# Patient Record
Sex: MALE | Race: OTHER | Employment: FULL TIME | URBAN - METROPOLITAN AREA
[De-identification: names, ages, dates, MRNs, and addresses within clinical notes are randomized per-mention and may not be internally consistent; named-entity substitution may affect disease eponyms.]

---

## 2019-09-20 ENCOUNTER — OFFICE VISIT (OUTPATIENT)
Dept: FAMILY MEDICINE CLINIC | Age: 29
End: 2019-09-20
Payer: COMMERCIAL

## 2019-09-20 VITALS
HEIGHT: 67 IN | WEIGHT: 137 LBS | SYSTOLIC BLOOD PRESSURE: 102 MMHG | HEART RATE: 51 BPM | BODY MASS INDEX: 21.5 KG/M2 | OXYGEN SATURATION: 98 % | DIASTOLIC BLOOD PRESSURE: 67 MMHG | TEMPERATURE: 97.8 F

## 2019-09-20 DIAGNOSIS — K40.90 LEFT INGUINAL HERNIA: ICD-10-CM

## 2019-09-20 DIAGNOSIS — F17.210 CIGARETTE NICOTINE DEPENDENCE WITHOUT COMPLICATION: ICD-10-CM

## 2019-09-20 DIAGNOSIS — Z76.89 ENCOUNTER TO ESTABLISH CARE: Primary | ICD-10-CM

## 2019-09-20 PROCEDURE — 99203 OFFICE O/P NEW LOW 30 MIN: CPT | Performed by: FAMILY MEDICINE

## 2019-09-20 PROCEDURE — 4004F PT TOBACCO SCREEN RCVD TLK: CPT | Performed by: FAMILY MEDICINE

## 2019-09-20 PROCEDURE — G8420 CALC BMI NORM PARAMETERS: HCPCS | Performed by: FAMILY MEDICINE

## 2019-09-20 PROCEDURE — G8427 DOCREV CUR MEDS BY ELIG CLIN: HCPCS | Performed by: FAMILY MEDICINE

## 2019-09-20 RX ORDER — NICOTINE 21 MG/24HR
1 PATCH, TRANSDERMAL 24 HOURS TRANSDERMAL EVERY 24 HOURS
Qty: 30 PATCH | Refills: 1 | Status: SHIPPED | OUTPATIENT
Start: 2019-09-20 | End: 2019-10-08

## 2019-09-20 SDOH — HEALTH STABILITY: MENTAL HEALTH: HOW OFTEN DO YOU HAVE A DRINK CONTAINING ALCOHOL?: NEVER

## 2019-09-20 ASSESSMENT — ENCOUNTER SYMPTOMS
VOMITING: 0
BACK PAIN: 0
DIARRHEA: 0
BLOOD IN STOOL: 0
COUGH: 0
SHORTNESS OF BREATH: 0
NAUSEA: 1
ABDOMINAL PAIN: 0
WHEEZING: 0
SORE THROAT: 0
CONSTIPATION: 0
TROUBLE SWALLOWING: 0

## 2019-09-20 ASSESSMENT — PATIENT HEALTH QUESTIONNAIRE - PHQ9
2. FEELING DOWN, DEPRESSED OR HOPELESS: 0
1. LITTLE INTEREST OR PLEASURE IN DOING THINGS: 0
SUM OF ALL RESPONSES TO PHQ QUESTIONS 1-9: 0
SUM OF ALL RESPONSES TO PHQ9 QUESTIONS 1 & 2: 0
SUM OF ALL RESPONSES TO PHQ QUESTIONS 1-9: 0

## 2019-09-20 NOTE — PROGRESS NOTES
Musculoskeletal: Negative for arthralgias, back pain and neck pain. Neurological: Negative for weakness, numbness and headaches. Psychiatric/Behavioral: Negative for dysphoric mood and sleep disturbance. The patient is not nervous/anxious. Health Maintenance Due   Topic Date Due    Pneumococcal 0-64 years Vaccine (1 of 1 - PPSV23) 12/12/1997    Varicella Vaccine (1 of 2 - 13+ 2-dose series) 12/12/2004    HIV screen  12/12/2006    DTaP/Tdap/Td vaccine (1 - Tdap) 12/12/2010    Flu vaccine (1) 09/01/2019       Current Outpatient Medications   Medication Sig Dispense Refill    Buprenorphine HCl-Naloxone HCl (SUBOXONE SL) Place 16 mg under the tongue      nicotine (NICODERM CQ) 21 MG/24HR Place 1 patch onto the skin every 24 hours (call office for next dose after six weeks of successful treatment) 30 patch 1     No current facility-administered medications for this visit. History    Past Medical History:   Diagnosis Date    Opioid use disorder ([facility] [address]        History reviewed. No pertinent surgical history.     No Known Allergies    Family History   Problem Relation Age of Onset    Hypertension Mother     Pancreatic Cancer Father 64       Social History     Socioeconomic History    Marital status: Single     Spouse name: None    Number of children: None    Years of education: None    Highest education level: None   Occupational History     Employer: [employer]   Social Needs    Financial resource strain: None    Food insecurity:     Worry: None     Inability: None    Transportation needs:     Medical: None     Non-medical: None   Tobacco Use    Smoking status: Current Every Day Smoker     Packs/day: 0.50     Years: 10.00     Pack years: 5.00     Types: Cigarettes     Start date: 9/20/2002    Smokeless tobacco: Never Used    Tobacco comment: Try to quit    Substance and Sexual Activity    Alcohol use: Not Currently     Frequency: Never    Drug use: Not Currently     Comment: opioid addiction     Sexual activity: Not Currently     Partners: Female   Lifestyle    Physical activity:     Days per week: None     Minutes per session: None    Stress: None   Relationships    Social connections:     Talks on phone: None     Gets together: None     Attends Restorationist service: None     Active member of club or organization: None     Attends meetings of clubs or organizations: None     Relationship status: None    Intimate partner violence:     Fear of current or ex partner: None     Emotionally abused: None     Physically abused: None     Forced sexual activity: None   Other Topics Concern    None   Social History Narrative    None       OBJECTIVE    /67   Pulse 51   Temp 97.8 °F (36.6 °C) (Temporal)   Ht 5' 7\" (1.702 m)   Wt 137 lb (62.1 kg)   SpO2 98%   BMI 21.46 kg/m²     Wt Readings from Last 3 Encounters:   09/20/19 137 lb (62.1 kg)       Physical Exam   Constitutional: He is oriented to person, place, and time. No distress. HENT:   Head: Normocephalic and atraumatic. Right Ear: External ear normal.   Left Ear: External ear normal.   Nose: Nose normal.   Mouth/Throat: Oropharynx is clear and moist.   Bifid uvula   Eyes: Pupils are equal, round, and reactive to light. Conjunctivae and EOM are normal.   Neck: Neck supple. Carotid bruit is not present. No thyromegaly present. Cardiovascular: Normal rate, regular rhythm, normal heart sounds and intact distal pulses. Pulmonary/Chest: Effort normal and breath sounds normal.   Abdominal: Soft. Bowel sounds are normal. There is no tenderness. There is no rigidity and no guarding. A hernia is present. Hernia confirmed positive in the left inguinal area. Musculoskeletal: He exhibits no edema. Neurological: He is alert and oriented to person, place, and time. Skin: Skin is warm and dry. He is not diaphoretic. Psychiatric: He has a normal mood and affect. His behavior is normal.       ASSESSMENT AND PLAN    1.  Encounter to establish care    2. Left inguinal hernia  Refer to general surgery for further evaluation and possible surgical correction. Discussed s/s for which to call vs seek emergent medical attention. Provided with relevant printed material.  - [address] [address] [facility]    3. Cigarette nicotine dependence without complication  Begin nicoderm, discussed instructions for proper use. - nicotine (NICODERM CQ) 21 MG/24HR; Place 1 patch onto the skin every 24 hours (call office for next dose after six weeks of successful treatment)  Dispense: 30 patch; Refill: 1    Return in about 1 year (around 9/20/2020) for yearly exam, or sooner as needed. Judge Whipple, DO  09/22/19  8:46 AM    Patient Instructions     Patient Education        Inguinal Hernia: Care Instructions  Your Care Instructions    An inguinal hernia occurs when tissue bulges through a weak spot in your groin area. You may see or feel a tender bulge in the groin or scrotum. You may also have pain, pressure or burning, or a feeling that something has \"given way. \"  Hernias are caused by a weakness in the belly wall. The bulge or discomfort may occur after heavy lifting, straining, or coughing. Hernias do not heal on their own, and they tend to get worse over time. If your hernia does not bother you, you most likely can wait to have surgery. Your hernia may get worse, but it may not. In some cases, hernias that are small and painless may never need to be repaired. Follow-up care is a key part of your treatment and safety. Be sure to make and go to all appointments, and call your doctor if you are having problems. It's also a good idea to know your test results and keep a list of the medicines you take. How can you care for yourself at home? · Take pain medicines exactly as directed. ? If the doctor gave you a prescription medicine for pain, take it as prescribed.   ? If you are not taking a prescription pain medicine, ask your doctor if you can take

## 2019-10-03 ENCOUNTER — INITIAL CONSULT (OUTPATIENT)
Dept: SURGERY | Age: 29
End: 2019-10-03

## 2019-10-03 ENCOUNTER — TELEPHONE (OUTPATIENT)
Dept: SURGERY | Age: 29
End: 2019-10-03

## 2019-10-03 ENCOUNTER — INITIAL CONSULT (OUTPATIENT)
Dept: SURGERY | Age: 29
End: 2019-10-03
Payer: COMMERCIAL

## 2019-10-03 VITALS
HEART RATE: 48 BPM | WEIGHT: 137 LBS | OXYGEN SATURATION: 98 % | SYSTOLIC BLOOD PRESSURE: 110 MMHG | HEIGHT: 68 IN | DIASTOLIC BLOOD PRESSURE: 68 MMHG | BODY MASS INDEX: 20.76 KG/M2 | TEMPERATURE: 97.9 F

## 2019-10-03 DIAGNOSIS — K40.90 UNILATERAL INGUINAL HERNIA WITHOUT OBSTRUCTION OR GANGRENE, RECURRENCE NOT SPECIFIED: Primary | ICD-10-CM

## 2019-10-03 PROCEDURE — 4004F PT TOBACCO SCREEN RCVD TLK: CPT | Performed by: SURGERY

## 2019-10-03 PROCEDURE — G8420 CALC BMI NORM PARAMETERS: HCPCS | Performed by: SURGERY

## 2019-10-03 PROCEDURE — G8427 DOCREV CUR MEDS BY ELIG CLIN: HCPCS | Performed by: SURGERY

## 2019-10-03 PROCEDURE — 99204 OFFICE O/P NEW MOD 45 MIN: CPT | Performed by: SURGERY

## 2019-10-03 PROCEDURE — G8484 FLU IMMUNIZE NO ADMIN: HCPCS | Performed by: SURGERY

## 2019-10-08 ENCOUNTER — ANESTHESIA EVENT (OUTPATIENT)
Dept: OPERATING ROOM | Age: 29
End: 2019-10-08
Payer: COMMERCIAL

## 2019-10-08 ENCOUNTER — PREP FOR PROCEDURE (OUTPATIENT)
Dept: SURGERY | Age: 29
End: 2019-10-08

## 2019-10-08 RX ORDER — SODIUM CHLORIDE 0.9 % (FLUSH) 0.9 %
10 SYRINGE (ML) INJECTION EVERY 12 HOURS SCHEDULED
Status: CANCELLED | OUTPATIENT
Start: 2019-10-08

## 2019-10-08 RX ORDER — SODIUM CHLORIDE 0.9 % (FLUSH) 0.9 %
10 SYRINGE (ML) INJECTION PRN
Status: CANCELLED | OUTPATIENT
Start: 2019-10-08

## 2019-10-08 RX ORDER — SODIUM CHLORIDE, SODIUM LACTATE, POTASSIUM CHLORIDE, CALCIUM CHLORIDE 600; 310; 30; 20 MG/100ML; MG/100ML; MG/100ML; MG/100ML
INJECTION, SOLUTION INTRAVENOUS CONTINUOUS
Status: CANCELLED | OUTPATIENT
Start: 2019-10-08

## 2019-10-09 ENCOUNTER — ANESTHESIA (OUTPATIENT)
Dept: OPERATING ROOM | Age: 29
End: 2019-10-09
Payer: COMMERCIAL

## 2019-10-09 ENCOUNTER — HOSPITAL ENCOUNTER (OUTPATIENT)
Age: 29
Setting detail: OUTPATIENT SURGERY
Discharge: HOME | End: 2019-10-09
Attending: SURGERY | Admitting: SURGERY
Payer: COMMERCIAL

## 2019-10-09 VITALS
SYSTOLIC BLOOD PRESSURE: 116 MMHG | TEMPERATURE: 98.8 F | DIASTOLIC BLOOD PRESSURE: 73 MMHG | HEART RATE: 59 BPM | BODY MASS INDEX: 20.76 KG/M2 | HEIGHT: 68 IN | OXYGEN SATURATION: 100 % | RESPIRATION RATE: 16 BRPM | WEIGHT: 137 LBS

## 2019-10-09 VITALS
RESPIRATION RATE: 1 BRPM | OXYGEN SATURATION: 91 % | TEMPERATURE: 98.6 F | DIASTOLIC BLOOD PRESSURE: 76 MMHG | SYSTOLIC BLOOD PRESSURE: 139 MMHG

## 2019-10-09 PROCEDURE — 2500000003 HC RX 250 WO HCPCS: Performed by: SURGERY

## 2019-10-09 PROCEDURE — 7100000001 HC PACU RECOVERY - ADDTL 15 MIN: Performed by: SURGERY

## 2019-10-09 PROCEDURE — 2580000003 HC RX 258: Performed by: SURGERY

## 2019-10-09 PROCEDURE — 6360000002 HC RX W HCPCS: Performed by: SURGERY

## 2019-10-09 PROCEDURE — 2500000003 HC RX 250 WO HCPCS: Performed by: NURSE ANESTHETIST, CERTIFIED REGISTERED

## 2019-10-09 PROCEDURE — 6360000002 HC RX W HCPCS: Performed by: ANESTHESIOLOGY

## 2019-10-09 PROCEDURE — 3600000014 HC SURGERY LEVEL 4 ADDTL 15MIN: Performed by: SURGERY

## 2019-10-09 PROCEDURE — 2709999900 HC NON-CHARGEABLE SUPPLY: Performed by: SURGERY

## 2019-10-09 PROCEDURE — 3700000001 HC ADD 15 MINUTES (ANESTHESIA): Performed by: SURGERY

## 2019-10-09 PROCEDURE — 49650 LAP ING HERNIA REPAIR INIT: CPT | Performed by: SURGERY

## 2019-10-09 PROCEDURE — C1781 MESH (IMPLANTABLE): HCPCS | Performed by: SURGERY

## 2019-10-09 PROCEDURE — 3700000000 HC ANESTHESIA ATTENDED CARE: Performed by: SURGERY

## 2019-10-09 PROCEDURE — 7100000010 HC PHASE II RECOVERY - FIRST 15 MIN: Performed by: SURGERY

## 2019-10-09 PROCEDURE — 3600000004 HC SURGERY LEVEL 4 BASE: Performed by: SURGERY

## 2019-10-09 PROCEDURE — 2580000003 HC RX 258: Performed by: ANESTHESIOLOGY

## 2019-10-09 PROCEDURE — 88304 TISSUE EXAM BY PATHOLOGIST: CPT

## 2019-10-09 PROCEDURE — 6360000002 HC RX W HCPCS: Performed by: NURSE ANESTHETIST, CERTIFIED REGISTERED

## 2019-10-09 PROCEDURE — 7100000000 HC PACU RECOVERY - FIRST 15 MIN: Performed by: SURGERY

## 2019-10-09 PROCEDURE — 7100000011 HC PHASE II RECOVERY - ADDTL 15 MIN: Performed by: SURGERY

## 2019-10-09 PROCEDURE — 2720000010 HC SURG SUPPLY STERILE: Performed by: SURGERY

## 2019-10-09 DEVICE — MESH SURG XL W4.8XL6.7IN L L PORE LTWT FOR INGUINAL HERN: Type: IMPLANTABLE DEVICE | Site: INGUINAL | Status: FUNCTIONAL

## 2019-10-09 RX ORDER — KETOROLAC TROMETHAMINE 30 MG/ML
INJECTION, SOLUTION INTRAMUSCULAR; INTRAVENOUS
Status: DISCONTINUED
Start: 2019-10-09 | End: 2019-10-09 | Stop reason: HOSPADM

## 2019-10-09 RX ORDER — KETOROLAC TROMETHAMINE 10 MG/1
10 TABLET, FILM COATED ORAL EVERY 6 HOURS PRN
Qty: 20 TABLET | Refills: 0 | Status: SHIPPED | OUTPATIENT
Start: 2019-10-09 | End: 2020-10-08

## 2019-10-09 RX ORDER — SODIUM CHLORIDE, SODIUM LACTATE, POTASSIUM CHLORIDE, CALCIUM CHLORIDE 600; 310; 30; 20 MG/100ML; MG/100ML; MG/100ML; MG/100ML
INJECTION, SOLUTION INTRAVENOUS CONTINUOUS
Status: DISCONTINUED | OUTPATIENT
Start: 2019-10-09 | End: 2019-10-09 | Stop reason: HOSPADM

## 2019-10-09 RX ORDER — KETOROLAC TROMETHAMINE 30 MG/ML
30 INJECTION, SOLUTION INTRAMUSCULAR; INTRAVENOUS
Status: COMPLETED | OUTPATIENT
Start: 2019-10-09 | End: 2019-10-09

## 2019-10-09 RX ORDER — KETOROLAC TROMETHAMINE 30 MG/ML
INJECTION, SOLUTION INTRAMUSCULAR; INTRAVENOUS PRN
Status: DISCONTINUED | OUTPATIENT
Start: 2019-10-09 | End: 2019-10-09 | Stop reason: SDUPTHER

## 2019-10-09 RX ORDER — ROCURONIUM BROMIDE 10 MG/ML
INJECTION, SOLUTION INTRAVENOUS PRN
Status: DISCONTINUED | OUTPATIENT
Start: 2019-10-09 | End: 2019-10-09 | Stop reason: SDUPTHER

## 2019-10-09 RX ORDER — DEXAMETHASONE SODIUM PHOSPHATE 10 MG/ML
INJECTION, SOLUTION INTRAMUSCULAR; INTRAVENOUS PRN
Status: DISCONTINUED | OUTPATIENT
Start: 2019-10-09 | End: 2019-10-09 | Stop reason: SDUPTHER

## 2019-10-09 RX ORDER — PROPOFOL 10 MG/ML
INJECTION, EMULSION INTRAVENOUS PRN
Status: DISCONTINUED | OUTPATIENT
Start: 2019-10-09 | End: 2019-10-09 | Stop reason: SDUPTHER

## 2019-10-09 RX ORDER — FENTANYL CITRATE 50 UG/ML
INJECTION, SOLUTION INTRAMUSCULAR; INTRAVENOUS PRN
Status: DISCONTINUED | OUTPATIENT
Start: 2019-10-09 | End: 2019-10-09 | Stop reason: SDUPTHER

## 2019-10-09 RX ORDER — BUPIVACAINE HYDROCHLORIDE AND EPINEPHRINE 2.5; 5 MG/ML; UG/ML
INJECTION, SOLUTION EPIDURAL; INFILTRATION; INTRACAUDAL; PERINEURAL PRN
Status: DISCONTINUED | OUTPATIENT
Start: 2019-10-09 | End: 2019-10-09 | Stop reason: ALTCHOICE

## 2019-10-09 RX ORDER — NEOSTIGMINE METHYLSULFATE 1 MG/ML
INJECTION, SOLUTION INTRAVENOUS PRN
Status: DISCONTINUED | OUTPATIENT
Start: 2019-10-09 | End: 2019-10-09 | Stop reason: SDUPTHER

## 2019-10-09 RX ORDER — ONDANSETRON 2 MG/ML
INJECTION INTRAMUSCULAR; INTRAVENOUS PRN
Status: DISCONTINUED | OUTPATIENT
Start: 2019-10-09 | End: 2019-10-09 | Stop reason: SDUPTHER

## 2019-10-09 RX ORDER — SODIUM CHLORIDE 0.9 % (FLUSH) 0.9 %
10 SYRINGE (ML) INJECTION EVERY 12 HOURS SCHEDULED
Status: DISCONTINUED | OUTPATIENT
Start: 2019-10-09 | End: 2019-10-09 | Stop reason: HOSPADM

## 2019-10-09 RX ORDER — SODIUM CHLORIDE 0.9 % (FLUSH) 0.9 %
10 SYRINGE (ML) INJECTION PRN
Status: DISCONTINUED | OUTPATIENT
Start: 2019-10-09 | End: 2019-10-09 | Stop reason: SDUPTHER

## 2019-10-09 RX ORDER — MEPERIDINE HYDROCHLORIDE 25 MG/ML
INJECTION INTRAMUSCULAR; INTRAVENOUS; SUBCUTANEOUS
Status: DISCONTINUED
Start: 2019-10-09 | End: 2019-10-09 | Stop reason: HOSPADM

## 2019-10-09 RX ORDER — SODIUM CHLORIDE 0.9 % (FLUSH) 0.9 %
10 SYRINGE (ML) INJECTION PRN
Status: DISCONTINUED | OUTPATIENT
Start: 2019-10-09 | End: 2019-10-09 | Stop reason: HOSPADM

## 2019-10-09 RX ORDER — MEPERIDINE HYDROCHLORIDE 25 MG/ML
12.5 INJECTION INTRAMUSCULAR; INTRAVENOUS; SUBCUTANEOUS ONCE
Status: COMPLETED | OUTPATIENT
Start: 2019-10-09 | End: 2019-10-09

## 2019-10-09 RX ORDER — SODIUM CHLORIDE 0.9 % (FLUSH) 0.9 %
10 SYRINGE (ML) INJECTION EVERY 12 HOURS SCHEDULED
Status: DISCONTINUED | OUTPATIENT
Start: 2019-10-09 | End: 2019-10-09 | Stop reason: SDUPTHER

## 2019-10-09 RX ORDER — GLYCOPYRROLATE 1 MG/5 ML
SYRINGE (ML) INTRAVENOUS PRN
Status: DISCONTINUED | OUTPATIENT
Start: 2019-10-09 | End: 2019-10-09 | Stop reason: SDUPTHER

## 2019-10-09 RX ADMIN — SODIUM CHLORIDE, POTASSIUM CHLORIDE, SODIUM LACTATE AND CALCIUM CHLORIDE: 600; 310; 30; 20 INJECTION, SOLUTION INTRAVENOUS at 09:11

## 2019-10-09 RX ADMIN — Medication 0.6 MG: at 09:40

## 2019-10-09 RX ADMIN — PROPOFOL 200 MG: 10 INJECTION, EMULSION INTRAVENOUS at 09:14

## 2019-10-09 RX ADMIN — Medication 35 MG: at 09:15

## 2019-10-09 RX ADMIN — Medication 2 G: at 09:11

## 2019-10-09 RX ADMIN — ONDANSETRON HYDROCHLORIDE 4 MG: 2 INJECTION, SOLUTION INTRAMUSCULAR; INTRAVENOUS at 09:29

## 2019-10-09 RX ADMIN — KETOROLAC TROMETHAMINE 60 MG: 30 INJECTION, SOLUTION INTRAMUSCULAR; INTRAVENOUS at 09:40

## 2019-10-09 RX ADMIN — FENTANYL CITRATE 100 MCG: 50 INJECTION, SOLUTION INTRAMUSCULAR; INTRAVENOUS at 09:22

## 2019-10-09 RX ADMIN — Medication 3 MG: at 09:40

## 2019-10-09 RX ADMIN — SODIUM CHLORIDE, POTASSIUM CHLORIDE, SODIUM LACTATE AND CALCIUM CHLORIDE: 600; 310; 30; 20 INJECTION, SOLUTION INTRAVENOUS at 08:38

## 2019-10-09 RX ADMIN — FENTANYL CITRATE 50 MCG: 50 INJECTION, SOLUTION INTRAMUSCULAR; INTRAVENOUS at 09:41

## 2019-10-09 RX ADMIN — KETOROLAC TROMETHAMINE 30 MG: 30 INJECTION, SOLUTION INTRAMUSCULAR at 10:18

## 2019-10-09 RX ADMIN — FENTANYL CITRATE 100 MCG: 50 INJECTION, SOLUTION INTRAMUSCULAR; INTRAVENOUS at 09:11

## 2019-10-09 RX ADMIN — MEPERIDINE HYDROCHLORIDE 12.5 MG: 25 INJECTION INTRAMUSCULAR; INTRAVENOUS; SUBCUTANEOUS at 10:16

## 2019-10-09 RX ADMIN — SODIUM CHLORIDE, POTASSIUM CHLORIDE, SODIUM LACTATE AND CALCIUM CHLORIDE: 600; 310; 30; 20 INJECTION, SOLUTION INTRAVENOUS at 09:44

## 2019-10-09 RX ADMIN — DEXAMETHASONE SODIUM PHOSPHATE 10 MG: 10 INJECTION, SOLUTION INTRAMUSCULAR; INTRAVENOUS at 09:28

## 2019-10-09 ASSESSMENT — PULMONARY FUNCTION TESTS
PIF_VALUE: 19
PIF_VALUE: 0
PIF_VALUE: 18
PIF_VALUE: 13
PIF_VALUE: 22
PIF_VALUE: 1
PIF_VALUE: 0
PIF_VALUE: 17
PIF_VALUE: 0
PIF_VALUE: 18
PIF_VALUE: 17
PIF_VALUE: 14
PIF_VALUE: 19
PIF_VALUE: 0
PIF_VALUE: 14
PIF_VALUE: 19
PIF_VALUE: 2
PIF_VALUE: 1
PIF_VALUE: 19
PIF_VALUE: 14
PIF_VALUE: 24
PIF_VALUE: 1
PIF_VALUE: 12
PIF_VALUE: 1
PIF_VALUE: 1
PIF_VALUE: 18
PIF_VALUE: 12
PIF_VALUE: 0
PIF_VALUE: 0
PIF_VALUE: 24
PIF_VALUE: 14
PIF_VALUE: 19
PIF_VALUE: 12
PIF_VALUE: 16
PIF_VALUE: 14
PIF_VALUE: 19
PIF_VALUE: 1
PIF_VALUE: 7
PIF_VALUE: 16

## 2019-10-09 ASSESSMENT — LIFESTYLE VARIABLES: SMOKING_STATUS: 1

## 2019-10-09 ASSESSMENT — PAIN SCALES - GENERAL
PAINLEVEL_OUTOF10: 5
PAINLEVEL_OUTOF10: 0
PAINLEVEL_OUTOF10: 6

## 2019-10-09 ASSESSMENT — PAIN DESCRIPTION - FREQUENCY: FREQUENCY: CONTINUOUS

## 2019-10-09 ASSESSMENT — PAIN - FUNCTIONAL ASSESSMENT
PAIN_FUNCTIONAL_ASSESSMENT: 0-10
PAIN_FUNCTIONAL_ASSESSMENT: ACTIVITIES ARE NOT PREVENTED

## 2019-10-09 ASSESSMENT — PAIN DESCRIPTION - ONSET: ONSET: ON-GOING

## 2019-10-09 ASSESSMENT — PAIN DESCRIPTION - PROGRESSION: CLINICAL_PROGRESSION: NOT CHANGED

## 2019-10-09 ASSESSMENT — PAIN DESCRIPTION - LOCATION: LOCATION: ABDOMEN

## 2019-10-09 ASSESSMENT — PAIN DESCRIPTION - PAIN TYPE: TYPE: SURGICAL PAIN

## 2019-10-09 ASSESSMENT — PAIN DESCRIPTION - DESCRIPTORS: DESCRIPTORS: ACHING;SORE

## 2019-10-09 ASSESSMENT — PAIN DESCRIPTION - ORIENTATION: ORIENTATION: MID

## 2019-10-31 ENCOUNTER — OFFICE VISIT (OUTPATIENT)
Dept: SURGERY | Age: 29
End: 2019-10-31

## 2019-10-31 VITALS
HEIGHT: 68 IN | DIASTOLIC BLOOD PRESSURE: 70 MMHG | BODY MASS INDEX: 20.76 KG/M2 | HEART RATE: 52 BPM | SYSTOLIC BLOOD PRESSURE: 110 MMHG | OXYGEN SATURATION: 98 % | TEMPERATURE: 97.1 F | WEIGHT: 137 LBS

## 2019-10-31 DIAGNOSIS — K40.90 UNILATERAL INGUINAL HERNIA WITHOUT OBSTRUCTION OR GANGRENE, RECURRENCE NOT SPECIFIED: Primary | ICD-10-CM

## 2019-10-31 PROCEDURE — 99024 POSTOP FOLLOW-UP VISIT: CPT | Performed by: SURGERY

## 2023-03-08 ENCOUNTER — HOSPITAL ENCOUNTER (EMERGENCY)
Age: 33
Discharge: HOME | End: 2023-03-08
Payer: COMMERCIAL

## 2023-03-08 VITALS
HEART RATE: 79 BPM | DIASTOLIC BLOOD PRESSURE: 110 MMHG | TEMPERATURE: 97.9 F | BODY MASS INDEX: 20.73 KG/M2 | SYSTOLIC BLOOD PRESSURE: 131 MMHG | WEIGHT: 140 LBS | OXYGEN SATURATION: 99 % | RESPIRATION RATE: 16 BRPM | HEIGHT: 69 IN

## 2023-03-08 DIAGNOSIS — S61.211A LACERATION OF LEFT INDEX FINGER WITHOUT FOREIGN BODY WITHOUT DAMAGE TO NAIL, INITIAL ENCOUNTER: Primary | ICD-10-CM

## 2023-03-08 PROCEDURE — 99282 EMERGENCY DEPT VISIT SF MDM: CPT

## 2023-03-08 PROCEDURE — 12001 RPR S/N/AX/GEN/TRNK 2.5CM/<: CPT

## 2023-03-08 ASSESSMENT — PAIN - FUNCTIONAL ASSESSMENT: PAIN_FUNCTIONAL_ASSESSMENT: NONE - DENIES PAIN

## 2023-03-08 NOTE — Clinical Note
Naun Bonner was seen and treated in our emergency department on 3/8/2023. He may return to work on 03/09/2023. Light duty if available      If you have any questions or concerns, please don't hesitate to call.       Rodger Lazaro PA-C

## 2023-03-08 NOTE — ED PROVIDER NOTES
Independent [facility] Visit. Department of Emergency Medicine   ED  Provider Note  Admit Date/RoomTime: 3/8/2023 12:40 PM  ED Room: 36/36   Chief Complaint   Laceration (Left second digit)    History of Present Illness      Sourav Goldman is a 33 y.o. old male presenting to the emergency department for a laceration to the left index finger. 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 He states his tetanus shot was within the last 2 years. He has no active bleeding at this exam.  He has full range of motion of affected finger but does have pain while doing so. Patient is alert and oriented x3 and in no apparent distress at this exam.  He is nontoxic-appearing. He denies any other complaint or concern at this ED visit. The patients tetanus status is within past 5 years. PCP: DO JAN Umana   Pertinent positives and negatives are stated within HPI, all other systems reviewed and are negative. Past Medical History:  has a past medical history of Opioid use disorder ([facility] [address] Past Surgical History:  has a past surgical history that includes hernia repair (Left, 10/9/2019). Social History:  reports that he has been smoking cigarettes. He started smoking about 20 years ago. He has a 5.00 pack-year smoking history. He has never used smokeless tobacco. He reports that he does not currently use alcohol. He reports that he does not currently use drugs. Family History: family history includes Hypertension in his mother; Pancreatic Cancer (age of onset: 64) in his father.      Allergies: Shellfish-derived products  Allergies reviewed with patient     Physical Exam   VS:  BP (!) 131/110   Pulse 79   Temp 97.9 °F (36.6 °C)   Resp 16   Ht 5' 9\" (1.753 m)   Wt 140 lb (63.5 kg)   SpO2 99%   BMI 20.67 kg/m²      Oxygen Saturation Interpretation: Normal.    Constitutional:  Alert and oriented x4, development consistent with age.  HEENT:  NC/NT. Airway patent. PERRLA, EOMI   Neck:  Normal ROM. Supple. Non-tender. Extremities: 1cm C-shaped laceration to DIP joint of left index finger, intact sensations distally, full range of motion, no active bleeding at this exam  Lymphatics: No lymphangitis or adenopathy noted. Neurological: CN II-XII grossly intact, Motor functions intact. Lab / Imaging Results   (All laboratory and radiology results have been personally reviewed by myself)  Labs:  No results found for this visit on 03/08/23. Imaging: All Radiology results interpreted by Radiologist unless otherwise noted. No orders to display     Procedure(s):  PROCEDURE NOTE      LACERATION REPAIR  Risks, benefits and alternatives (for applicable procedures below) described. Performed By: Leatha Herndon PA-C. Laceration #: 1. Location: left index  Length: 1cm. The wound area was irrigated with sterile saline, cleansed with povidone iodine, cleansend with shur-clens and draped in a sterile fashion. Local Anesthesia:  Lidocaine 1% without epinephrine. The wound was explored with the following results:  no foreign body or tendon injury seen. Flaps Aligned: yes. The wound was closed with 5-0 Ethilon using interrupted sutures. Dressing:  bacitracin and a sterile dressing was placed. Total number suture:  7. There were no additional lacerations requiring repair. -- MEDICAL DECISION MAKING --   History From: History from : Patient  Limitations to history : None    Record Review:  Outpatient Notes reviewed  Other Records Reviewed : None    CC/HPI Summary, DDx, ED Course, and Reassessment:   Patient presents to the ED for Laceration (Left second digit)    This is a 69-year-old male who presents with simple laceration to left index finger. Wound was repaired with 7 sutures.   No evidence of tendon injury    Patient was given the following medications:  Medications - No data to display     Differential diagnoses included but not limited to tendon injury, laceration     Reassessment:  Patient continues to be non-toxic on re-evaluation. Chronic Conditions:   Past Medical History:   Diagnosis Date    Opioid use disorder [facility])      ED COURSE:      Any labs and imaging were reviewed by myself. Consultations:  None  Discussion with Other Profesionals : None    COUNSELING:   I have spoken with the patient/caregiver and discussed todays results, in addition to providing specific details for the plan of care and counseling regarding the diagnosis and prognosis and are agreeable with the plan. All results reviewed with pt and all questions answered. I discussed the differential, results and discharge plan with the patient and/or family/friend/caregiver if present. I emphasized the importance of follow-up with the physician I referred them to in the timeframe recommended. I explained reasons for the patient to return to the Emergency Department. Additional verbal discharge instructions were also given and discussed with the patient to supplement those generated by the EMR. We also discussed medications that were prescribed (if any) including common side effects and interactions. All questions were addressed. They understand return precautions and discharge instructions. The patient and/or family/friend/caregiver expressed understanding. Vitals were stable and they were in no distress at discharge. Findings were discussed with the patient and reasons to immediately return to the ED were articulated to them. Patient educated on wound care and signs and symptoms of infection that would require his emergent return to the ED.     Patient will follow-up with their PMD    DISPOSITION CONSIDERATIONS:    Disposition Considerations:  (include Tests not done, Shared Decision Making, Pt Expectation of Test or Tx.):   Appropriate for outpatient management        Social Determinants:  Social Determinants : None    MEDICATIONS:   DISCHARGE MEDICATIONS:  Discharge Medication List as of 3/8/2023  1:49 PM        DISCONTINUED MEDICATIONS:  Discharge Medication List as of 3/8/2023  1:49 PM        DIAGNOSIS:     1. Laceration of left index finger without foreign body without damage to nail, initial encounter      This patient's ED course included: a personal history and physicial examination  This patient has remained hemodynamically stable during their ED course. DISPOSITION:   Discharge to home. Patient condition is good. Discharge Instructions:   Patient referred to  [address] [place] [address] -- 8 [facility]  [address] [facility] [phone]  Call in 1 day  for follow-up on ED visit for workers comp visit    Vikki Lizarraga [address] [phone]      Electronically signed by Lisa Peng PA-C   DD: 3/8/23  I am the Primary Clinician of Record. **This report was transcribed using voice recognition software. Every effort was made to ensure accuracy; however, inadvertent computerized transcription errors may be present.     END OF PROVIDER NOTE        Lisa Peng PA-C  03/08/23 7538

## 2023-03-08 NOTE — Clinical Note
Shelton Ayon was seen and treated in our emergency department on 3/8/2023. He may return to work on 03/15/2023. If you have any questions or concerns, please don't hesitate to call.       Princess Padgett PA-C

## 2023-10-17 ENCOUNTER — HOSPITAL ENCOUNTER (INPATIENT)
Age: 33
LOS: 6 days | Discharge: HOME | DRG: 751 | End: 2023-10-23
Attending: EMERGENCY MEDICINE | Admitting: PSYCHIATRY & NEUROLOGY
Payer: MEDICAID

## 2023-10-17 DIAGNOSIS — F19.10 POLYSUBSTANCE ABUSE (HCC): ICD-10-CM

## 2023-10-17 DIAGNOSIS — R44.3 HALLUCINATION: Primary | ICD-10-CM

## 2023-10-17 PROBLEM — F23 ACUTE PSYCHOSIS (HCC): Status: ACTIVE | Noted: 2023-10-17

## 2023-10-17 LAB
ALBUMIN SERPL-MCNC: 4.1 G/DL (ref 3.5–5.2)
ALP SERPL-CCNC: 54 U/L (ref 40–129)
ALT SERPL-CCNC: 45 U/L (ref 0–40)
AMPHET UR QL SCN: NEGATIVE
ANION GAP SERPL CALCULATED.3IONS-SCNC: 12 MMOL/L (ref 7–16)
APAP SERPL-MCNC: <5 UG/ML (ref 10–30)
AST SERPL-CCNC: 36 U/L (ref 0–39)
BACTERIA URNS QL MICRO: ABNORMAL
BARBITURATES UR QL SCN: NEGATIVE
BASOPHILS # BLD: 0.05 K/UL (ref 0–0.2)
BASOPHILS NFR BLD: 1 % (ref 0–2)
BENZODIAZ UR QL: NEGATIVE
BILIRUB SERPL-MCNC: 0.3 MG/DL (ref 0–1.2)
BILIRUB UR QL STRIP: NEGATIVE
BUN SERPL-MCNC: 12 MG/DL (ref 6–20)
BUPRENORPHINE UR QL: POSITIVE
CALCIUM SERPL-MCNC: 9 MG/DL (ref 8.6–10.2)
CANNABINOIDS UR QL SCN: NEGATIVE
CHLORIDE SERPL-SCNC: 102 MMOL/L (ref 98–107)
CLARITY UR: ABNORMAL
CO2 SERPL-SCNC: 25 MMOL/L (ref 22–29)
COCAINE UR QL SCN: NEGATIVE
COLOR UR: YELLOW
CREAT SERPL-MCNC: 0.8 MG/DL (ref 0.7–1.2)
EKG ATRIAL RATE: 55 BPM
EKG P AXIS: 71 DEGREES
EKG P-R INTERVAL: 140 MS
EKG Q-T INTERVAL: 438 MS
EKG QRS DURATION: 90 MS
EKG QTC CALCULATION (BAZETT): 419 MS
EKG R AXIS: 97 DEGREES
EKG T AXIS: 63 DEGREES
EKG VENTRICULAR RATE: 55 BPM
EOSINOPHIL # BLD: 0.16 K/UL (ref 0.05–0.5)
EOSINOPHILS RELATIVE PERCENT: 2 % (ref 0–6)
EPI CELLS #/AREA URNS HPF: ABNORMAL /HPF
ERYTHROCYTE [DISTWIDTH] IN BLOOD BY AUTOMATED COUNT: 12.9 % (ref 11.5–15)
ETHANOLAMINE SERPL-MCNC: <10 MG/DL
FENTANYL UR QL: NEGATIVE
GFR SERPL CREATININE-BSD FRML MDRD: >60 ML/MIN/1.73M2
GLUCOSE BLD-MCNC: 115 MG/DL (ref 74–99)
GLUCOSE SERPL-MCNC: 59 MG/DL (ref 74–99)
GLUCOSE UR STRIP-MCNC: NEGATIVE MG/DL
HCT VFR BLD AUTO: 38.6 % (ref 37–54)
HGB BLD-MCNC: 12.9 G/DL (ref 12.5–16.5)
HGB UR QL STRIP.AUTO: NEGATIVE
IMM GRANULOCYTES # BLD AUTO: <0.03 K/UL (ref 0–0.58)
IMM GRANULOCYTES NFR BLD: 0 % (ref 0–5)
KETONES UR STRIP-MCNC: NEGATIVE MG/DL
LEUKOCYTE ESTERASE UR QL STRIP: ABNORMAL
LYMPHOCYTES NFR BLD: 2.48 K/UL (ref 1.5–4)
LYMPHOCYTES RELATIVE PERCENT: 34 % (ref 20–42)
MCH RBC QN AUTO: 28.9 PG (ref 26–35)
MCHC RBC AUTO-ENTMCNC: 33.4 G/DL (ref 32–34.5)
MCV RBC AUTO: 86.5 FL (ref 80–99.9)
METHADONE UR QL: NEGATIVE
MONOCYTES NFR BLD: 0.5 K/UL (ref 0.1–0.95)
MONOCYTES NFR BLD: 7 % (ref 2–12)
NEUTROPHILS NFR BLD: 56 % (ref 43–80)
NEUTS SEG NFR BLD: 3.99 K/UL (ref 1.8–7.3)
NITRITE UR QL STRIP: NEGATIVE
OPIATES UR QL SCN: NEGATIVE
OXYCODONE UR QL SCN: NEGATIVE
PCP UR QL SCN: NEGATIVE
PH UR STRIP: 7.5 [PH] (ref 5–9)
PLATELET # BLD AUTO: 303 K/UL (ref 130–450)
PMV BLD AUTO: 9.5 FL (ref 7–12)
POTASSIUM SERPL-SCNC: 3.9 MMOL/L (ref 3.5–5)
PROT SERPL-MCNC: 6.6 G/DL (ref 6.4–8.3)
PROT UR STRIP-MCNC: NEGATIVE MG/DL
RBC # BLD AUTO: 4.46 M/UL (ref 3.8–5.8)
RBC #/AREA URNS HPF: ABNORMAL /HPF
SALICYLATES SERPL-MCNC: <0.3 MG/DL (ref 0–30)
SODIUM SERPL-SCNC: 139 MMOL/L (ref 132–146)
SP GR UR STRIP: 1.01 (ref 1–1.03)
TEST INFORMATION: ABNORMAL
TOXIC TRICYCLIC SC,BLOOD: NEGATIVE
UROBILINOGEN UR STRIP-ACNC: 0.2 EU/DL (ref 0–1)
WBC #/AREA URNS HPF: ABNORMAL /HPF
WBC OTHER # BLD: 7.2 K/UL (ref 4.5–11.5)

## 2023-10-17 PROCEDURE — 80179 DRUG ASSAY SALICYLATE: CPT

## 2023-10-17 PROCEDURE — 93005 ELECTROCARDIOGRAM TRACING: CPT | Performed by: NURSE PRACTITIONER

## 2023-10-17 PROCEDURE — 80307 DRUG TEST PRSMV CHEM ANLYZR: CPT

## 2023-10-17 PROCEDURE — 80053 COMPREHEN METABOLIC PANEL: CPT

## 2023-10-17 PROCEDURE — 6370000000 HC RX 637 (ALT 250 FOR IP): Performed by: NURSE PRACTITIONER

## 2023-10-17 PROCEDURE — 93010 ELECTROCARDIOGRAM REPORT: CPT | Performed by: INTERNAL MEDICINE

## 2023-10-17 PROCEDURE — 1240000000 HC EMOTIONAL WELLNESS R&B

## 2023-10-17 PROCEDURE — 6370000000 HC RX 637 (ALT 250 FOR IP): Performed by: EMERGENCY MEDICINE

## 2023-10-17 PROCEDURE — 99285 EMERGENCY DEPT VISIT HI MDM: CPT

## 2023-10-17 PROCEDURE — 80143 DRUG ASSAY ACETAMINOPHEN: CPT

## 2023-10-17 PROCEDURE — 85025 COMPLETE CBC W/AUTO DIFF WBC: CPT

## 2023-10-17 PROCEDURE — 81001 URINALYSIS AUTO W/SCOPE: CPT

## 2023-10-17 PROCEDURE — 82962 GLUCOSE BLOOD TEST: CPT

## 2023-10-17 PROCEDURE — G0480 DRUG TEST DEF 1-7 CLASSES: HCPCS

## 2023-10-17 RX ORDER — ACETAMINOPHEN 500 MG
1000 TABLET ORAL ONCE
Status: COMPLETED | OUTPATIENT
Start: 2023-10-17 | End: 2023-10-17

## 2023-10-17 RX ORDER — ACETAMINOPHEN 325 MG/1
650 TABLET ORAL EVERY 6 HOURS PRN
Status: DISCONTINUED | OUTPATIENT
Start: 2023-10-17 | End: 2023-10-23 | Stop reason: HOSPADM

## 2023-10-17 RX ORDER — HYDROXYZINE PAMOATE 50 MG/1
50 CAPSULE ORAL 3 TIMES DAILY PRN
Status: DISCONTINUED | OUTPATIENT
Start: 2023-10-17 | End: 2023-10-23 | Stop reason: HOSPADM

## 2023-10-17 RX ORDER — HALOPERIDOL 5 MG/ML
5 INJECTION INTRAMUSCULAR EVERY 6 HOURS PRN
Status: DISCONTINUED | OUTPATIENT
Start: 2023-10-17 | End: 2023-10-23 | Stop reason: HOSPADM

## 2023-10-17 RX ORDER — POLYETHYLENE GLYCOL 3350 17 G
2 POWDER IN PACKET (EA) ORAL ONCE
Status: COMPLETED | OUTPATIENT
Start: 2023-10-17 | End: 2023-10-17

## 2023-10-17 RX ORDER — MAGNESIUM HYDROXIDE/ALUMINUM HYDROXICE/SIMETHICONE 120; 1200; 1200 MG/30ML; MG/30ML; MG/30ML
30 SUSPENSION ORAL PRN
Status: DISCONTINUED | OUTPATIENT
Start: 2023-10-17 | End: 2023-10-23 | Stop reason: HOSPADM

## 2023-10-17 RX ORDER — LANOLIN ALCOHOL/MO/W.PET/CERES
3 CREAM (GRAM) TOPICAL NIGHTLY PRN
Status: DISCONTINUED | OUTPATIENT
Start: 2023-10-17 | End: 2023-10-20

## 2023-10-17 RX ORDER — HALOPERIDOL 5 MG/1
5 TABLET ORAL EVERY 6 HOURS PRN
Status: DISCONTINUED | OUTPATIENT
Start: 2023-10-17 | End: 2023-10-23 | Stop reason: HOSPADM

## 2023-10-17 RX ORDER — NICOTINE 21 MG/24HR
1 PATCH, TRANSDERMAL 24 HOURS TRANSDERMAL DAILY
Status: DISCONTINUED | OUTPATIENT
Start: 2023-10-18 | End: 2023-10-23 | Stop reason: HOSPADM

## 2023-10-17 RX ADMIN — NICOTINE POLACRILEX 2 MG: 2 LOZENGE ORAL at 16:47

## 2023-10-17 RX ADMIN — ACETAMINOPHEN 1000 MG: 500 TABLET ORAL at 04:49

## 2023-10-17 ASSESSMENT — PAIN DESCRIPTION - DESCRIPTORS: DESCRIPTORS: TINGLING

## 2023-10-17 ASSESSMENT — PAIN - FUNCTIONAL ASSESSMENT
PAIN_FUNCTIONAL_ASSESSMENT: 0-10
PAIN_FUNCTIONAL_ASSESSMENT: NONE - DENIES PAIN

## 2023-10-17 ASSESSMENT — PATIENT HEALTH QUESTIONNAIRE - PHQ9
SUM OF ALL RESPONSES TO PHQ9 QUESTIONS 1 & 2: 0
2. FEELING DOWN, DEPRESSED OR HOPELESS: 0
SUM OF ALL RESPONSES TO PHQ QUESTIONS 1-9: 0
SUM OF ALL RESPONSES TO PHQ QUESTIONS 1-9: 0
1. LITTLE INTEREST OR PLEASURE IN DOING THINGS: 0
SUM OF ALL RESPONSES TO PHQ QUESTIONS 1-9: 0
SUM OF ALL RESPONSES TO PHQ QUESTIONS 1-9: 0

## 2023-10-17 ASSESSMENT — PAIN SCALES - GENERAL: PAINLEVEL_OUTOF10: 5

## 2023-10-17 ASSESSMENT — PAIN DESCRIPTION - PAIN TYPE: TYPE: CHRONIC PAIN

## 2023-10-17 ASSESSMENT — PAIN DESCRIPTION - ORIENTATION: ORIENTATION: RIGHT;LEFT

## 2023-10-17 ASSESSMENT — PAIN DESCRIPTION - LOCATION: LOCATION: FOOT;ANKLE

## 2023-10-17 ASSESSMENT — LIFESTYLE VARIABLES: HOW OFTEN DO YOU HAVE A DRINK CONTAINING ALCOHOL: NEVER

## 2023-10-17 NOTE — ED NOTES
N-n given to miko morley @ [facility], Vladimir Paige RN  10/17/23 1821 [facility], RN  10/17/23 2772

## 2023-10-17 NOTE — ED NOTES
Unable to complete discussing case with on call d/t wanting bianca information concerning why pt at [facility].   Informed fide [facility] of this entry     Summer Smith  10/17/23 8149
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
## 2023-10-17 NOTE — ED NOTES
The pt meets criteria for inpt admission. Informed Sidney Connolly of need to review.      Radha Yi, [facility]  10/17/23 2602

## 2023-10-17 NOTE — ED NOTES
Pt reports he is not on Suboxone states he hasn't had it in month then in another breath pt reports that he buys it off  the street.   But this time I thought I bought heroin     Mynor Skaggs RN  10/17/23 6093

## 2023-10-17 NOTE — ED NOTES
Patient brought back to [facility] by [facility]. Per Dr. Dena Jensen patient [facility], patient okay for 15 minute monitoring.        Frederick Gould RN  10/17/23 6607

## 2023-10-17 NOTE — ED NOTES
Pt requested nicotine patch then stated would like to try lozenges. Orders placed at this time.      Lillie Barcenas RN  10/17/23 3988

## 2023-10-17 NOTE — ED NOTES
The pt was accepted to [facility] room 2308. Disposition called to Mehdi Must in admitting.      Ernesto Mcbride, [facility]  10/17/23 0336

## 2023-10-17 NOTE — ED NOTES
Pt overheard stating that he should just leave. This rn informed pt of involuntary hold and given copy at this time.      Michel Young RN  10/17/23 7420

## 2023-10-17 NOTE — ED PROVIDER NOTES
[facility]  ED  Encounter Note  Admit Date/RoomTime: 10/17/2023  1:11 AM  ED Room: CHAIR05/C5   Department of Emergency Medicine  ED Provider Note  Admit Date: 10/17/2023  Room: 970 [facility]      ED Physician     10/17/23  1:20 AM EDT    HPI: Abraham Rosa 33 y.o. male presents with a complaint of auditory and visual hallucinations beginning days ago. Complaint has been constant and became more severe today which is what prompted the visit. Denies suicidal ideation. Denies homicidal ideation. No specific plan. Patient presents to the emergency department with his , patient is reporting having hallucinations both auditory and visual.  States he has never had a mental health evaluation. ,  He also reports not being on any medications. Patient reports that he is hearing people from his past.  He also reports at times seeing people in his peripheral vision. Patient denies any suicidal or homicidal ideations. He would also like to speak to someone about going and drug detox, states that he relapsed 3 days ago using methamphetamines as well as opioids/heroin. Patient denies any alcohol use. Patient otherwise denies chest pain denies shortness of breath denies any abdominal pain as well as no noted nausea, vomiting or diarrhea. Patient calm with clear and coherent speech, nonpressured. Review of Systems:   Pertinent positives and negatives are stated within HPI, all other systems reviewed and are negative.      --------------------------------------------- PAST HISTORY ---------------------------------------------  Past Medical History:  has a past medical history of Opioid use disorder ([address] Past Surgical History:  has a past surgical history that includes hernia repair (Left, 10/9/2019). Social History:  reports that he has been smoking cigarettes. He started smoking about 21 years ago.  He has a 5.00 pack-year smoking

## 2023-10-18 PROBLEM — F39 MOOD DISORDER (HCC): Status: ACTIVE | Noted: 2023-10-18

## 2023-10-18 PROBLEM — F19.94 SUBSTANCE INDUCED MOOD DISORDER (HCC): Status: ACTIVE | Noted: 2023-10-18

## 2023-10-18 PROCEDURE — 1240000000 HC EMOTIONAL WELLNESS R&B

## 2023-10-18 PROCEDURE — 6370000000 HC RX 637 (ALT 250 FOR IP): Performed by: NURSE PRACTITIONER

## 2023-10-18 PROCEDURE — 90792 PSYCH DIAG EVAL W/MED SRVCS: CPT | Performed by: NURSE PRACTITIONER

## 2023-10-18 PROCEDURE — 6370000000 HC RX 637 (ALT 250 FOR IP): Performed by: PSYCHIATRY & NEUROLOGY

## 2023-10-18 RX ORDER — DIVALPROEX SODIUM 500 MG/1
500 TABLET, EXTENDED RELEASE ORAL NIGHTLY
Status: DISCONTINUED | OUTPATIENT
Start: 2023-10-18 | End: 2023-10-20

## 2023-10-18 RX ORDER — OLANZAPINE 5 MG/1
5 TABLET ORAL NIGHTLY
Status: DISCONTINUED | OUTPATIENT
Start: 2023-10-18 | End: 2023-10-20

## 2023-10-18 RX ADMIN — OLANZAPINE 5 MG: 5 TABLET, FILM COATED ORAL at 21:44

## 2023-10-18 RX ADMIN — DIVALPROEX SODIUM 500 MG: 500 TABLET, EXTENDED RELEASE ORAL at 21:44

## 2023-10-18 RX ADMIN — MELATONIN 3 MG ORAL TABLET 3 MG: 3 TABLET ORAL at 21:44

## 2023-10-18 ASSESSMENT — SLEEP AND FATIGUE QUESTIONNAIRES
DO YOU USE A SLEEP AID: NO
DO YOU HAVE DIFFICULTY SLEEPING: NO
DO YOU USE A SLEEP AID: NO
DO YOU HAVE DIFFICULTY SLEEPING: YES
AVERAGE NUMBER OF SLEEP HOURS: 5
AVERAGE NUMBER OF SLEEP HOURS: 5
SLEEP PATTERN: DIFFICULTY FALLING ASLEEP;DISTURBED/INTERRUPTED SLEEP

## 2023-10-18 ASSESSMENT — LIFESTYLE VARIABLES
HOW OFTEN DO YOU HAVE A DRINK CONTAINING ALCOHOL: NEVER
HOW MANY STANDARD DRINKS CONTAINING ALCOHOL DO YOU HAVE ON A TYPICAL DAY: PATIENT DOES NOT DRINK
HOW MANY STANDARD DRINKS CONTAINING ALCOHOL DO YOU HAVE ON A TYPICAL DAY: PATIENT DOES NOT DRINK
HOW OFTEN DO YOU HAVE A DRINK CONTAINING ALCOHOL: NEVER
HOW MANY STANDARD DRINKS CONTAINING ALCOHOL DO YOU HAVE ON A TYPICAL DAY: PATIENT DOES NOT DRINK
HOW OFTEN DO YOU HAVE A DRINK CONTAINING ALCOHOL: NEVER

## 2023-10-18 ASSESSMENT — PATIENT HEALTH QUESTIONNAIRE - PHQ9
SUM OF ALL RESPONSES TO PHQ QUESTIONS 1-9: 16
2. FEELING DOWN, DEPRESSED OR HOPELESS: 3
6. FEELING BAD ABOUT YOURSELF - OR THAT YOU ARE A FAILURE OR HAVE LET YOURSELF OR YOUR FAMILY DOWN: 2
10. IF YOU CHECKED OFF ANY PROBLEMS, HOW DIFFICULT HAVE THESE PROBLEMS MADE IT FOR YOU TO DO YOUR WORK, TAKE CARE OF THINGS AT HOME, OR GET ALONG WITH OTHER PEOPLE: 2
2. FEELING DOWN, DEPRESSED OR HOPELESS: 1
1. LITTLE INTEREST OR PLEASURE IN DOING THINGS: 3
7. TROUBLE CONCENTRATING ON THINGS, SUCH AS READING THE NEWSPAPER OR WATCHING TELEVISION: 2
8. MOVING OR SPEAKING SO SLOWLY THAT OTHER PEOPLE COULD HAVE NOTICED. OR THE OPPOSITE, BEING SO FIGETY OR RESTLESS THAT YOU HAVE BEEN MOVING AROUND A LOT MORE THAN USUAL: 1
SUM OF ALL RESPONSES TO PHQ QUESTIONS 1-9: 1
SUM OF ALL RESPONSES TO PHQ9 QUESTIONS 1 & 2: 1
SUM OF ALL RESPONSES TO PHQ QUESTIONS 1-9: 16
SUM OF ALL RESPONSES TO PHQ QUESTIONS 1-9: 1
SUM OF ALL RESPONSES TO PHQ QUESTIONS 1-9: 1
1. LITTLE INTEREST OR PLEASURE IN DOING THINGS: 0
5. POOR APPETITE OR OVEREATING: 0
4. FEELING TIRED OR HAVING LITTLE ENERGY: 2
SUM OF ALL RESPONSES TO PHQ QUESTIONS 1-9: 1
SUM OF ALL RESPONSES TO PHQ QUESTIONS 1-9: 15
3. TROUBLE FALLING OR STAYING ASLEEP: 2
SUM OF ALL RESPONSES TO PHQ QUESTIONS 1-9: 16
SUM OF ALL RESPONSES TO PHQ9 QUESTIONS 1 & 2: 6
9. THOUGHTS THAT YOU WOULD BE BETTER OFF DEAD, OR OF HURTING YOURSELF: 1

## 2023-10-18 NOTE — CARE COORDINATION
Biopsychosocial Assessment Note    Social work met with patient to complete the biopsychosocial assessment and C-SSRS. Chief Complaint: \"I need evaluated for probation\". Mental Status Exam: Pt presents as A&Ox4, friendly and cooperative with good eye contact. Pt is anxious and depressed with congruent affect. PT thoughts appear organized. PT insight/judgment poor. PT denied SI/HI/VH, admits to [facility] of sounds- nothing specific and nothing commanding. Clinical Summary: Pt reports that he is here because he needed a mental health evaluation for his . He reports that he also came on his own because he has mental health needs and wants to be put on mental health medications. Pt reports that he has been struggling with depression and anxiety. He reports that he has also been having auditory and visual hallucinations. PT reports that he was \"hearing different things throughout the day\", but denied that it was anything specific. He also states that he has been \"seeing things that he has seen in the past\", but again denied anything specific. PT denied that they are commanding hallucinations. Pt denied SI/HI prior to admission. Pt reports that this is his first psychiatric admission. Per chart, pt has hx of admission to [facility] for possible SI. Pt reports that he has a hx of outpatient treatment in [place] for his depression, anxiety, and PTSD. Pt denied hx of suicide attempts, reports that he has a hx of SI, most recently being about a week ago when he was in [facility]. Pt admits that he has been feeling hopeless/helpless and that he has had little interest/pleasure in doing things recently. PT reports that life in general is a stressor for him. PT denied hx of trauma/abuse. He reports that he was recently charged with misdemeanor for trespassing and menacing, that he was released from [facility] a week ago and is on probation in [facility].  PT reports that he uses \"every\"

## 2023-10-18 NOTE — DISCHARGE INSTRUCTIONS
Attending Provider: Kem Mansfield MD.     If you have any questions and need to contact this individual please call the unit at [phone]. [facility] Provider will be available on call 24/7 and during holidays. Reason for Admission: Patient states that he is here per recommendation from his . Patient admits to having both auditory and visual hallucinations that started around 2 months ago. Patient states, \"I hear things that kind of give me signs to do this or do that. Sometimes its names, states, or things I heard throughout the day. I usually hear one word at a time and then I fill in the rest in my head. I kind of like it though\". States that he also \"sees things in my peripheral\". Patient admits to recent use of amphetamines and marijuana before coming to the hospital       Follow up for Tobacco Cessation at:    [facility] Tobacco Treatment                                 Date:  Friday 10/27 at [address] [place] [address]. [address]   ([address] [place] [address] elevators to 7th floor)   Phone: [phone]   Fax: [phone]         Homeless Resources:       [address] [facility] [phone]     [address] before 6535 [facility] PM [address] [phone]        after 4 PM  [address] [facility], [address] [phone]     [address] [place] [address],[address] Floor- [address] [phone]     [facility] [address] [facility] [address] [place] [address] [phone] 49[address] [facility], [address]     [address] [phone] [facility] [phone]     [place] [phone]     The [address] [place] [address]- Phone:

## 2023-10-18 NOTE — H&P
of mental health admissions  Substance abuse  History of legal problems and criminal activity  Isolated from family  Limited support system  Protective factors  Access to essential needs and shelter  Help seeking behavior    Collateral Information:  Will obtain collateral information from the family or friends. Will obtain medical records as appropriate from out patient providers  Will consult the hospitalist for a physical exam to rule out any co-morbid physical condition. Home medication Reconciled   Reviewed continued as clinically indicated  Patient not currently prescribed buprenorphine    New Medications started during this admission :    Depakote 250 mg twice daily for mood stabilization  Zyprexa 5 mg at at bedtime for augmentation of treatment    Prn Haldol 5mg and Vistaril 50mg q6hr for extreme agitation. Trazodone as ordered for insomnia  Vistaril as ordered for anxiety      Psychotherapy:   Encourage participation in milieu and group therapy  Individual therapy as needed      NOTE: This report was transcribed using voice recognition software. Every effort was made to ensure accuracy; however, inadvertent computerized transcription errors may be present. Behavioral Services  Medicare Certification Upon Admission    I certify that this patient's inpatient psychiatric hospital admission is medically necessary for:    [x] (1) Treatment which could reasonably be expected to improve this patient's condition,       [x] (2) Or for diagnostic study;     AND     [x](2) The inpatient psychiatric services are provided while the individual is under the care of a physician and are included in the individualized plan of care.     Estimated length of stay/service 5 to 7 days based on stability    Plan for post-hospital care follow-up with outpatient provider    Electronically signed by ANNALEE Noriega CNP on 10/18/2023 at 9:17 AM      Electronically signed by ANNALEE Noriega CNP on 10/18/2023 at

## 2023-10-19 LAB
CHOLEST SERPL-MCNC: 179 MG/DL
HBA1C MFR BLD: 5.5 % (ref 4–5.6)
HDLC SERPL-MCNC: 114 MG/DL
LDLC SERPL CALC-MCNC: 52 MG/DL
TRIGL SERPL-MCNC: 66 MG/DL
VLDLC SERPL CALC-MCNC: 13 MG/DL

## 2023-10-19 PROCEDURE — 6370000000 HC RX 637 (ALT 250 FOR IP): Performed by: NURSE PRACTITIONER

## 2023-10-19 PROCEDURE — 6370000000 HC RX 637 (ALT 250 FOR IP): Performed by: PSYCHIATRY & NEUROLOGY

## 2023-10-19 PROCEDURE — 36415 COLL VENOUS BLD VENIPUNCTURE: CPT

## 2023-10-19 PROCEDURE — 83036 HEMOGLOBIN GLYCOSYLATED A1C: CPT

## 2023-10-19 PROCEDURE — 99232 SBSQ HOSP IP/OBS MODERATE 35: CPT | Performed by: NURSE PRACTITIONER

## 2023-10-19 PROCEDURE — 1240000000 HC EMOTIONAL WELLNESS R&B

## 2023-10-19 PROCEDURE — 80061 LIPID PANEL: CPT

## 2023-10-19 RX ADMIN — OLANZAPINE 5 MG: 5 TABLET, FILM COATED ORAL at 21:29

## 2023-10-19 RX ADMIN — MELATONIN 3 MG ORAL TABLET 3 MG: 3 TABLET ORAL at 21:30

## 2023-10-19 RX ADMIN — DIVALPROEX SODIUM 500 MG: 500 TABLET, EXTENDED RELEASE ORAL at 21:29

## 2023-10-19 RX ADMIN — ACETAMINOPHEN 650 MG: 325 TABLET ORAL at 20:14

## 2023-10-19 ASSESSMENT — PAIN DESCRIPTION - DESCRIPTORS: DESCRIPTORS: ACHING;THROBBING

## 2023-10-19 ASSESSMENT — PAIN DESCRIPTION - LOCATION: LOCATION: BACK;HEAD

## 2023-10-19 NOTE — GROUP NOTE
Group Therapy Note    Date: 10/19/2023    Group Start Time: 1105  Group End Time: 7204  Group Topic: Psychotherapy    [facility]    Minerva Mendez MSW, ROSENDO        Group Therapy Note    Attendees: 9       Patient's Goal:  To increase social interaction and improve relationships with others. Notes:  Pt was attentive in group and was able to identify an agenda. They were also able to verbalize relating to others within the group. Status After Intervention:  Improved    Participation Level: Active Listener and Interactive    Participation Quality: Appropriate, Attentive, and Sharing      Speech:  normal      Thought Process/Content: Logical      Affective Functioning: Congruent      Mood: anxious      Level of consciousness:  Alert, Oriented x4, and Attentive      Response to Learning: Able to verbalize current knowledge/experience, Able to verbalize/acknowledge new learning, and Able to retain information      Endings: None Reported    Modes of Intervention: Education, Support, Socialization, Exploration, Clarifying, and Problem-solving      Discipline Responsible: /Counselor      Signature:   SPARKLE Hernández, ROSENDO

## 2023-10-19 NOTE — CARE COORDINATION
JUDAH contacted [facility] [phone] and was advised pt has not been assigned an officer yet. He will need to come complete an intake and at that time he will be assigned a . JUDAH contacted [employer] [phone] and notified pt  Hector Baez of pt admission. Hector Baez stated pt will need to go to the office on day of discharge.

## 2023-10-20 PROCEDURE — 1240000000 HC EMOTIONAL WELLNESS R&B

## 2023-10-20 PROCEDURE — 6370000000 HC RX 637 (ALT 250 FOR IP): Performed by: PSYCHIATRY & NEUROLOGY

## 2023-10-20 PROCEDURE — 99232 SBSQ HOSP IP/OBS MODERATE 35: CPT | Performed by: NURSE PRACTITIONER

## 2023-10-20 PROCEDURE — 6370000000 HC RX 637 (ALT 250 FOR IP): Performed by: NURSE PRACTITIONER

## 2023-10-20 RX ORDER — OXCARBAZEPINE 150 MG/1
150 TABLET, FILM COATED ORAL 2 TIMES DAILY
Status: DISCONTINUED | OUTPATIENT
Start: 2023-10-20 | End: 2023-10-21

## 2023-10-20 RX ADMIN — HYDROXYZINE PAMOATE 50 MG: 50 CAPSULE ORAL at 10:23

## 2023-10-20 RX ADMIN — OXCARBAZEPINE 150 MG: 150 TABLET, FILM COATED ORAL at 21:04

## 2023-10-20 RX ADMIN — OLANZAPINE 7.5 MG: 5 TABLET, FILM COATED ORAL at 21:04

## 2023-10-20 RX ADMIN — OXCARBAZEPINE 150 MG: 150 TABLET, FILM COATED ORAL at 10:22

## 2023-10-20 NOTE — GROUP NOTE
Group Therapy Note    Date: 10/20/2023    Group Start Time: 1115  Group End Time: 1150  Group Topic: Cognitive Skills    [facility]    Erich Jimenez MSW, LSW        Group Therapy Note    Attendees:11       Patient's Goal: to participate in group discussion on positive psychology prompt cards. Notes: pt was an active participant in group. Status After Intervention:  Unchanged    Participation Level:  Active Listener and Interactive    Participation Quality: Attentive and Sharing      Speech:  normal      Thought Process/Content: Logical      Affective Functioning: Congruent      Mood: anxious      Level of consciousness:  Alert and Oriented x4      Response to Learning: Able to verbalize current knowledge/experience      Endings: None Reported    Modes of Intervention: Education, Support, Socialization, Exploration, Clarifying, and Problem-solving      Discipline Responsible: /Counselor      Signature:  SPARKLE Blake, LSPETER

## 2023-10-20 NOTE — CARE COORDINATION
Pt was seen during treatment team. Pt reports feeling tired from the medications. Pt denied SI/HI/VH. Pt reports AH of \"regular voices. \" Pt has been experiencing AH for 3 months and he is not on the right medications because \"they are not working fast enough. \" Pt stated he came in for depression and his depression is \"killing me slowly. \" Pt does not want to go to a substance abuse rehab at time of discharge. Pt plans to \"hit the streets. \" Pt was staying at a house prior to admission but he will not be returning there. Pt has stayed with a boss in the past but the boss did not pay him all summer. Pt reports his  will tell him to go to the rescue mission but he does not want to go there because he cannot smoke marijuana. Pt reports he walked here to get help with his depression because it has been \"horrible. \" Pt reports a loss of interest in things he used to enjoy. Pt cooperative, flat, blunt, poor insight/judgement.

## 2023-10-21 PROCEDURE — 99232 SBSQ HOSP IP/OBS MODERATE 35: CPT | Performed by: NURSE PRACTITIONER

## 2023-10-21 PROCEDURE — 1240000000 HC EMOTIONAL WELLNESS R&B

## 2023-10-21 PROCEDURE — 6370000000 HC RX 637 (ALT 250 FOR IP): Performed by: PSYCHIATRY & NEUROLOGY

## 2023-10-21 PROCEDURE — 6370000000 HC RX 637 (ALT 250 FOR IP): Performed by: NURSE PRACTITIONER

## 2023-10-21 RX ORDER — OXCARBAZEPINE 300 MG/1
300 TABLET, FILM COATED ORAL 2 TIMES DAILY
Status: DISCONTINUED | OUTPATIENT
Start: 2023-10-21 | End: 2023-10-23 | Stop reason: HOSPADM

## 2023-10-21 RX ADMIN — OXCARBAZEPINE 300 MG: 300 TABLET, FILM COATED ORAL at 20:57

## 2023-10-21 RX ADMIN — OXCARBAZEPINE 150 MG: 150 TABLET, FILM COATED ORAL at 08:53

## 2023-10-21 RX ADMIN — HYDROXYZINE PAMOATE 50 MG: 50 CAPSULE ORAL at 20:57

## 2023-10-21 RX ADMIN — OLANZAPINE 7.5 MG: 5 TABLET, FILM COATED ORAL at 20:57

## 2023-10-21 RX ADMIN — HYDROXYZINE PAMOATE 50 MG: 50 CAPSULE ORAL at 13:21

## 2023-10-21 ASSESSMENT — PAIN DESCRIPTION - PAIN TYPE: TYPE: CHRONIC PAIN

## 2023-10-21 ASSESSMENT — PAIN DESCRIPTION - LOCATION: LOCATION: BACK;LEG

## 2023-10-21 ASSESSMENT — PAIN DESCRIPTION - ORIENTATION: ORIENTATION: RIGHT;LEFT

## 2023-10-21 ASSESSMENT — PAIN SCALES - GENERAL: PAINLEVEL_OUTOF10: 8

## 2023-10-21 NOTE — GROUP NOTE
Group Therapy Note    Date: 10/21/2023    Group Start Time: 1100  Group End Time: 4703  Group Topic: Cognitive Skills    [facility]  [place] Edwina, Reid ríos, SPARKLE, LSW        Group Therapy Note    Attendees: 10       Patient's Goal:  Pt will be able to identify triggers for anger, coping skills to help with anger, how to manage and prevent anger and warning signs. Notes:  pt participated in group and made connections. Status After Intervention:  Improved    Participation Level:  Active Listener and Interactive    Participation Quality: Appropriate, Attentive, Sharing, and Supportive      Speech:  normal      Thought Process/Content: Logical  Linear      Affective Functioning: Congruent      Mood: anxious      Level of consciousness:  Alert, Oriented x4, and Attentive      Response to Learning: Able to verbalize current knowledge/experience, Able to verbalize/acknowledge new learning, Able to retain information, and Capable of insight      Endings: None Reported    Modes of Intervention: Education, Support, Socialization, Exploration, Clarifying, and Problem-solving      Discipline Responsible: /Counselor      Signature:  SPARKLE Norton, [facility]

## 2023-10-21 NOTE — GROUP NOTE
Group Therapy Note    Date: 10/21/2023  Start Time: 1430  End Time:  1263  Number of Participants: 11    Type of Group: Recreational    Wellness Binder Information  Module Name:  Guess It Game      Patient's Goal:  to increase socialization amongst peers. Enhance cognitive skills through trivia. Notes:  CTRS facilitated a game called the [employer] it Game    Status After Intervention:  Improved    Participation Level:  Active Listener and Interactive    Participation Quality: Appropriate, Attentive, Sharing, and Supportive      Speech:  normal      Thought Process/Content: Logical      Affective Functioning: Congruent      Mood: euthymic      Level of consciousness:  Alert and Attentive      Response to Learning: Able to verbalize current knowledge/experience, Able to verbalize/acknowledge new learning, and Able to retain information      Endings: None Reported    Modes of Intervention: Education, Socialization, and Activity      Discipline Responsible: Psychoeducational Specialist      Signature:  AMY Shelley     Group Therapy Note    Attendees: 11

## 2023-10-22 PROCEDURE — 99232 SBSQ HOSP IP/OBS MODERATE 35: CPT | Performed by: NURSE PRACTITIONER

## 2023-10-22 PROCEDURE — 6370000000 HC RX 637 (ALT 250 FOR IP): Performed by: NURSE PRACTITIONER

## 2023-10-22 PROCEDURE — 6370000000 HC RX 637 (ALT 250 FOR IP): Performed by: PSYCHIATRY & NEUROLOGY

## 2023-10-22 PROCEDURE — 1240000000 HC EMOTIONAL WELLNESS R&B

## 2023-10-22 RX ORDER — OLANZAPINE 10 MG/1
10 TABLET ORAL NIGHTLY
Status: DISCONTINUED | OUTPATIENT
Start: 2023-10-22 | End: 2023-10-23 | Stop reason: HOSPADM

## 2023-10-22 RX ADMIN — OXCARBAZEPINE 300 MG: 300 TABLET, FILM COATED ORAL at 09:06

## 2023-10-22 RX ADMIN — HYDROXYZINE PAMOATE 50 MG: 50 CAPSULE ORAL at 12:20

## 2023-10-22 RX ADMIN — HYDROXYZINE PAMOATE 50 MG: 50 CAPSULE ORAL at 21:47

## 2023-10-22 RX ADMIN — OLANZAPINE 10 MG: 10 TABLET, FILM COATED ORAL at 21:11

## 2023-10-22 RX ADMIN — OXCARBAZEPINE 300 MG: 300 TABLET, FILM COATED ORAL at 21:11

## 2023-10-22 ASSESSMENT — PAIN SCALES - GENERAL
PAINLEVEL_OUTOF10: 0
PAINLEVEL_OUTOF10: 0

## 2023-10-22 NOTE — GROUP NOTE
Group Therapy Note    Date: 10/22/2023    Group Start Time: 1100  Group End Time: 7484  Group Topic: Cognitive Skills    [facility]  [facility] Reid Bland, SPARKLE, ROSENDO        Group Therapy Note    Attendees: 13     Patient's Goal:  Pt will be able to identify their values and how they change in different environments and how they have changed with different life experiences. Notes:  pt participated in group and made connections. Status After Intervention:  Improved    Participation Level:  Active Listener and Interactive    Participation Quality: Appropriate, Attentive, Sharing, and Supportive      Speech:  normal      Thought Process/Content: Logical  Linear      Affective Functioning: Congruent      Mood: anxious      Level of consciousness:  Alert, Oriented x4, and Attentive      Response to Learning: Able to verbalize current knowledge/experience, Able to verbalize/acknowledge new learning, Able to retain information, and Capable of insight      Endings: None Reported    Modes of Intervention: Education, Support, Socialization, Exploration, Clarifying, and Problem-solving      Discipline Responsible: /Counselor      Signature:  SPARKLE Poe, [place]

## 2023-10-22 NOTE — GROUP NOTE
Group Therapy Note    Date: 10/22/2023    Group Start Time: 1010  Group End Time: 1050  Group Topic: Psychoeducation    [facility]    Emmanuel Russell                                                                        Group Therapy Note    Date: 10/22/2023  Module Name:  creating a healthy routine   Patient's Goal:  Patient will be able to id holguin steps to maintaining a healthy routine. Notes:  Pleasant and engaged in group. Able to share when prompted and accepting of handout. Status After Intervention:  Improved    Participation Level:  Active Listener and Interactive    Participation Quality: Appropriate, Attentive, and Sharing      Speech:  normal      Thought Process/Content: Logical      Affective Functioning: Congruent      Mood: euthymic      Level of consciousness:  Alert, Oriented x4, and Attentive      Response to Learning: Able to verbalize/acknowledge new learning, Able to retain information, and Progressing to goal      Endings: None Reported    Modes of Intervention: Education, Support, Socialization, and Problem-solving      Discipline Responsible: Psychoeducational Specialist      Signature:  Emmanuel Russell

## 2023-10-22 NOTE — GROUP NOTE
Group Therapy Note    Date: 10/22/2023    Group Start Time: 5353  Group End Time: 5013  Group Topic: Recreational    [facility]    Erickson Ayala                                                                        Group Therapy Note    Date: 10/22/2023  Module Name:  Thought catalog trivia   Patient's Goal:  Patient will be able to participate in group trivia about fast food, sitcom television. Notes:  Pleasant and engaged in group, able to share appropriately with others. Status After Intervention:  Improved    Participation Level:  Active Listener and Interactive    Participation Quality: Appropriate, Attentive, and Sharing      Speech:  normal      Thought Process/Content: Logical      Affective Functioning: Congruent      Mood: euthymic      Level of consciousness:  Alert, Oriented x4, and Attentive      Response to Learning: Able to verbalize/acknowledge new learning, Able to retain information, and Progressing to goal      Endings: None Reported    Modes of Intervention: Support, Socialization, and Activity      Discipline Responsible: Psychoeducational Specialist      Signature:  Erickson Ayala

## 2023-10-22 NOTE — PLAN OF CARE
[facility]  Day 3 Interdisciplinary Treatment Plan NOTE    Review Date & Time: 10/20/2023   1:17 PM     Patient was in treatment team    Estimated Length of Stay Update:  7 days  Estimated Discharge Date Update: 10/24/23    EDUCATION:   Learner Progress Toward Treatment Goals: Reviewed results and recommendations of this team    Method: Group    Outcome: Verbalized understanding    PATIENT GOALS:  \" to feel less depressed\"    PLAN/TREATMENT RECOMMENDATIONS UPDATE: encourage daily goals and groups    GOALS UPDATE:   Time frame for Short-Term Goals: by discharge      Raudel Cruz RN
Patient is alert and oriented x 4. Denies pain. No noted signs or symptoms of distress. Denies SI/HI, or thoughts of self harm when asked. Patient states he hears \"things I've heard in the past\" when asked for further detail patient states [employer], places. \"  Patient states these are his A/H when he was asked. Rates Anxiety at 8/10 and Depression at 8/10. Attended on unit groups this shift. Medication compliant. Polite, and cooperative. Constricted Affect, underlying irritability, describes feelings of hopelessness and helplessness. Appropriate with peers and staff. Appears well groomed/neat, room Is clean. Goal for the day was - \"get out of my room and walk. \"    Up for all meals. Will continue to monitor for safety q 15 minute safety rounds and environmental assessments. Problem: Anxiety  Goal: Will report anxiety at manageable levels  Description: INTERVENTIONS:  1. Administer medication as ordered  2. Teach and rehearse alternative coping skills  3.  Provide emotional support with 1:1 interaction with staff  10/22/2023 1700 by Aleyda Gracia RN  Outcome: Not Progressing  Flowsheets (Taken 10/22/2023 0830)  Will report anxiety at manageable levels: Provide emotional support with 1:1 interaction with staff  10/22/2023 0607 by Orquidea Wang RN  Outcome: Progressing
Problem: Anxiety  Goal: Will report anxiety at manageable levels  Description: INTERVENTIONS:  1. Administer medication as ordered  2. Teach and rehearse alternative coping skills  3. Provide emotional support with 1:1 interaction with staff  10/18/2023 0946 by Latia Garcia RN  Outcome: Progressing  10/18/2023 0440 by Yessenia Isaac RN  Outcome: Progressing     Problem: Coping  Goal: Pt/Family able to verbalize concerns and demonstrate effective coping strategies  Description: INTERVENTIONS:  1. Assist patient/family to identify coping skills, available support systems and cultural and spiritual values  2. Provide emotional support, including active listening and acknowledgement of concerns of patient and caregivers  3. Reduce environmental stimuli, as able  4. Instruct patient/family in relaxation techniques, as appropriate  5. Assess for spiritual pain/suffering and initiate Spiritual Care, Psychosocial Clinical Specialist consults as needed  Outcome: Progressing     Problem: Decision Making  Goal: Pt/Family able to effectively weigh alternatives and participate in decision making related to treatment and care  Description: INTERVENTIONS:  1. Determine when there are differences between patient's view, family's view, and healthcare provider's view of condition  2. Facilitate patient and family articulation of goals for care  3. Help patient and family identify pros/cons of alternative solutions  4. Provide information as requested by patient/family  5. Respect patient/family right to receive or not to receive information  6. Serve as a liaison between patient and family and health care team  7. Initiate Consults from Ethics, Palliative Care or initiate [address] as is appropriate  10/18/2023 0440 by Yessenia Isaac RN  Outcome: Progressing     Problem: Confusion  Goal: Confusion, delirium, dementia, or psychosis is improved or at baseline  Description: INTERVENTIONS:  1.  Assess for
Problem: Anxiety  Goal: Will report anxiety at manageable levels  Description: INTERVENTIONS:  1. Administer medication as ordered  2. Teach and rehearse alternative coping skills  3. Provide emotional support with 1:1 interaction with staff  Outcome: Not Progressing     Problem: Depression/Self Harm  Goal: Effect of psychiatric condition will be minimized and patient will be protected from self harm  Description: INTERVENTIONS:  1. Assess impact of patient's symptoms on level of functioning, self care needs and offer support as indicated  2. Assess patient/family knowledge of depression, impact on illness and need for teaching  3. Provide emotional support, presence and reassurance  4. Assess for possible suicidal thoughts or ideation. If patient expresses suicidal thoughts or statements do not leave alone, initiate Suicide Precautions, move to a room close to the nursing station and obtain sitter  5. Initiate consults as appropriate with Mental Health Professional, Spiritual Care, Psychosocial CNS, and consider a recommendation to the LIP for a Psychiatric Consultation  Outcome: Not Progressing         PATIENT PLEASANT AND COOPERATIVE. FLAT AND DEPRESSED. ISOLATIVE TO ROOM. MINIMAL CONVERSATION. DENIES SUICIDAL AND HOMICIDAL THOUGHTS. DENIES HALLUCINATIONS.
Problem: Anxiety  Goal: Will report anxiety at manageable levels  Description: INTERVENTIONS:  1. Administer medication as ordered  2. Teach and rehearse alternative coping skills  3. Provide emotional support with 1:1 interaction with staff  Outcome: Progressing     Problem: Decision Making  Goal: Pt/Family able to effectively weigh alternatives and participate in decision making related to treatment and care  Description: INTERVENTIONS:  1. Determine when there are differences between patient's view, family's view, and healthcare provider's view of condition  2. Facilitate patient and family articulation of goals for care  3. Help patient and family identify pros/cons of alternative solutions  4. Provide information as requested by patient/family  5. Respect patient/family right to receive or not to receive information  6. Serve as a liaison between patient and family and health care team  7. Initiate Consults from Ethics, Palliative Care or initiate [facility] as is appropriate  Outcome: Progressing     Problem: Confusion  Goal: Confusion, delirium, dementia, or psychosis is improved or at baseline  Description: INTERVENTIONS:  1. Assess for possible contributors to thought disturbance, including medications, impaired vision or hearing, underlying metabolic abnormalities, dehydration, psychiatric diagnoses, and notify attending LIP  2. [place] high risk fall precautions, as indicated  3. Provide frequent short contacts to provide reality reorientation, refocusing and direction  4. Decrease environmental stimuli, including noise as appropriate  5. Monitor and intervene to maintain adequate nutrition, hydration, elimination, sleep and activity  6. If unable to ensure safety without constant attention obtain sitter and review sitter guidelines with assigned personnel  7.  Initiate Psychosocial CNS and Spiritual Care consult, as indicated  Outcome: Progressing
Problem: Coping  Goal: Pt/Family able to verbalize concerns and demonstrate effective coping strategies  Description: INTERVENTIONS:  1. Assist patient/family to identify coping skills, available support systems and cultural and spiritual values  2. Provide emotional support, including active listening and acknowledgement of concerns of patient and caregivers  3. Reduce environmental stimuli, as able  4. Instruct patient/family in relaxation techniques, as appropriate  5. Assess for spiritual pain/suffering and initiate Spiritual Care, Psychosocial Clinical Specialist consults as needed  Outcome: Not Progressing     Problem: Depression/Self Harm  Goal: Effect of psychiatric condition will be minimized and patient will be protected from self harm  Description: INTERVENTIONS:  1. Assess impact of patient's symptoms on level of functioning, self care needs and offer support as indicated  2. Assess patient/family knowledge of depression, impact on illness and need for teaching  3. Provide emotional support, presence and reassurance  4. Assess for possible suicidal thoughts or ideation. If patient expresses suicidal thoughts or statements do not leave alone, initiate Suicide Precautions, move to a room close to the nursing station and obtain sitter  5. Initiate consults as appropriate with Mental Health Professional, Spiritual Care, Psychosocial CNS, and consider a recommendation to the LIP for a Psychiatric Consultation  Outcome: Not Progressing        Patient has been isolative to self and room except e does attend groups. Instructed on ordered for 2 hour lockouts. He verbalizes he doesn't feel any better and his depression is the same. Irritable in treatment team. He reports \"don't care anymore, not on the right meds\". He wants to \"hit the streets\" when discharged. Later patient was brighter and pleasant. Denies suicidal and homicidal thoughts. Admits to hallucinations but vague and would not elaborate.
Pt resting in bed apparently asleep with easy even respirations at HS q 15 min electronic rounding.
Pt was out into milieu at intervals. Denied suicidal/homicidal ideations. Denied hallucinations, but said he saw \"shadows passing by\" in his side vision. Appears sad and rates depression 10/10. Pt appears tense and rates his anxiety 8/10. Pt said he has chronic pain in his back and legs and rates the pain 8/10, said that is why he lays down. Pt remains on close observation staggered q 15 min checks.
exit the unit/facility without proper excort  Outcome: Progressing     Problem: Pain  Goal: Verbalizes/displays adequate comfort level or baseline comfort level  Outcome: Progressing

## 2023-10-23 VITALS
BODY MASS INDEX: 19.99 KG/M2 | TEMPERATURE: 98.7 F | HEIGHT: 69 IN | RESPIRATION RATE: 14 BRPM | DIASTOLIC BLOOD PRESSURE: 65 MMHG | SYSTOLIC BLOOD PRESSURE: 108 MMHG | HEART RATE: 52 BPM | OXYGEN SATURATION: 99 % | WEIGHT: 135 LBS

## 2023-10-23 PROCEDURE — 6360000002 HC RX W HCPCS: Performed by: NURSE PRACTITIONER

## 2023-10-23 PROCEDURE — 99239 HOSP IP/OBS DSCHRG MGMT >30: CPT | Performed by: NURSE PRACTITIONER

## 2023-10-23 PROCEDURE — 6370000000 HC RX 637 (ALT 250 FOR IP): Performed by: PSYCHIATRY & NEUROLOGY

## 2023-10-23 PROCEDURE — 90686 IIV4 VACC NO PRSV 0.5 ML IM: CPT | Performed by: NURSE PRACTITIONER

## 2023-10-23 PROCEDURE — 6370000000 HC RX 637 (ALT 250 FOR IP): Performed by: NURSE PRACTITIONER

## 2023-10-23 PROCEDURE — G0008 ADMIN INFLUENZA VIRUS VAC: HCPCS | Performed by: NURSE PRACTITIONER

## 2023-10-23 RX ORDER — OLANZAPINE 10 MG/1
10 TABLET ORAL NIGHTLY
Qty: 30 TABLET | Refills: 0 | Status: ON HOLD | OUTPATIENT
Start: 2023-10-23 | End: 2023-11-22

## 2023-10-23 RX ORDER — OXCARBAZEPINE 300 MG/1
300 TABLET, FILM COATED ORAL 2 TIMES DAILY
Qty: 60 TABLET | Refills: 0 | Status: ON HOLD | OUTPATIENT
Start: 2023-10-23 | End: 2023-11-22

## 2023-10-23 RX ADMIN — OXCARBAZEPINE 300 MG: 300 TABLET, FILM COATED ORAL at 08:36

## 2023-10-23 RX ADMIN — INFLUENZA VIRUS VACCINE 0.5 ML: 15; 15; 15; 15 SUSPENSION INTRAMUSCULAR at 12:28

## 2023-10-23 RX ADMIN — ACETAMINOPHEN 650 MG: 325 TABLET ORAL at 14:00

## 2023-10-23 ASSESSMENT — PAIN DESCRIPTION - LOCATION: LOCATION: HEAD

## 2023-10-23 ASSESSMENT — PAIN SCALES - GENERAL
PAINLEVEL_OUTOF10: 3
PAINLEVEL_OUTOF10: 0

## 2023-10-23 NOTE — DISCHARGE SUMMARY
DISCHARGE SUMMARY      Patient ID:  Amaris Wilcox  [id number]  71 y.o.  1990    Admit date: 10/17/2023    Discharge date and time: 10/23/2023    Admitting Physician: Mirta Cordero MD     Discharge Physician: Dr Mando Danielle MD    Discharge Diagnoses:   Patient Active Problem List   Diagnosis    Left inguinal hernia    Acute psychosis ([address])    Mood disorder ([address])    Substance induced mood disorder ([address])       Admission Condition: poor    Discharged Condition: stable    Admission Circumstance: Presented to the ER with complaint of visual hallucinations.  Pink slipped       PAST MEDICAL/PSYCHIATRIC HISTORY:   Past Medical History:   Diagnosis Date    Opioid use disorder        FAMILY/SOCIAL HISTORY:  Family History   Problem Relation Age of Onset    Hypertension Mother     Pancreatic Cancer Father 64     Social History     Socioeconomic History    Marital status: Single     Spouse name: Not on file    Number of children: Not on file    Years of education: Not on file    Highest education level: Not on file   Occupational History     Employer: [employer]   Tobacco Use    Smoking status: Every Day     Packs/day: 0.50     Years: 10.00     Additional pack years: 0.00     Total pack years: 5.00     Types: Cigarettes     Start date: 9/20/2002    Smokeless tobacco: Never    Tobacco comments:     Try to quit    Vaping Use    Vaping Use: Never used   Substance and Sexual Activity    Alcohol use: Not Currently    Drug use: Not Currently     Comment: opioid addiction clean about 2 years    Sexual activity: Not Currently     Partners: Female   Other Topics Concern    Not on file   Social History Narrative    Not on file     Social Determinants of Health     Financial Resource Strain: Not on file   Food Insecurity: Not on file   Transportation Needs: Not on file   Physical Activity: Not on file   Stress: Not on file   Social Connections: Not on file   Intimate Partner Violence: Not on file   Housing Stability: Not on file

## 2023-10-23 NOTE — CARE COORDINATION
Pt was seen during treatment team. Pt reports feeling good today, denied SI/HI/VH and stated the AH are better than when he came in. Pt feels ready to discharge today. Pt plans to go meet with his  in [facility], then go to the [facility] probation office to complete the intake to get to connected with a , and then try to stay with his mom who lives in [facility]. Pt tried to call his mom yesterday but she did not answer. Pt will be connected with a provider near [facility]. SW will also place homeless resources in his discharge paperwork. Pt cooperative, pleasant, appropriate, with good eye contact, clear speech, improved insight/judgement. JUDAH contacted pt mom Maritza Chaney [phone] (verbal permission given during treatment team). No answer, a voicemail was left. JUDAH contacted [facility] [phone] and scheduled an intake appointment on 11/2.      In order to ensure appropriate transition and discharge planning is in place, the following documents have been transmitted to [facility], as the new outpatient provider:    The d/c diagnosis was transmitted to the next care provider  The reason for hospitalization was transmitted to the next care provider  The d/c medications (dosage and indication) were transmitted to the next care provider   The continuing care plan was transmitted to the next care provider

## 2023-10-23 NOTE — GROUP NOTE
Group Therapy Note    Date: 10/23/2023    Group Start Time: 1010  Group End Time: 1050  Group Topic: Psychoeducation    [facility] 7[facility] ACUTE BH 2    Mckenna Yeh                                                                        Group Therapy Note    Date: 10/23/2023  Start Time: 1010  End Time:  1050  Number of Participants: 15    Type of Group: Psychoeducation    Wellness Binder Information  Module Name:  Sleep Hygiene      Patient's Goal:  To ID 1 or 2 ways to improve sleep. Notes:  CTRS discussed sleep facts and the importance of sleep. CTRS then discussed ways to improve sleep. Patient was able to ID one way to improve sleep patterns, and was receptive to information provided. Patient was also accepting of handout. Status After Intervention:  Improved    Participation Level:  Active Listener and Interactive    Participation Quality: Appropriate, Attentive, and Sharing      Speech:  normal      Thought Process/Content: Logical      Affective Functioning: Congruent      Mood: euthymic      Level of consciousness:  Alert and Attentive      Response to Learning: Able to verbalize current knowledge/experience, Able to verbalize/acknowledge new learning, and Able to retain information      Endings: None Reported    Modes of Intervention: Education and Support      Discipline Responsible: Psychoeducational Specialist      Signature:  Mckenna Yeh

## 2023-10-23 NOTE — CASE COMMUNICATION
Per RN, patient has a copay of approximately $30.00 and he does not have any money to pay for it. SW Supervisor called the outpatient pharmacy and spoke to Peterson Sheets, who reported that pt's prescriptions were submitted to pt's medicaid and he owes $30.00. JUDAH Supervisor approved medication voucher and provided it to [place].     SPARKLE Yates, CRISTIANO Romero Work Supervisor

## 2023-10-23 NOTE — GROUP NOTE
Group Therapy Note    Date: 10/23/2023    Group Start Time: 1115  Group End Time: 1200  Group Topic: Psychotherapy    [facility] 7[facility] ACUTE BH 1    Minerva Mendez, SPARKLE, ANDERSONW        Group Therapy Note    Attendees: 10       Patient's Goal:  To increase social interaction and improve relationships with others. Notes:  Pt was attentive in group and was able to identify an agenda. They were also able to verbalize relating to others within the group. Status After Intervention:  Improved    Participation Level: Active Listener and Interactive    Participation Quality: Appropriate, Attentive, Sharing, and Supportive      Speech:  normal      Thought Process/Content: Logical      Affective Functioning: Congruent      Mood: euthymic      Level of consciousness:  Alert, Oriented x4, and Attentive      Response to Learning: Able to verbalize current knowledge/experience, Able to verbalize/acknowledge new learning, Able to retain information, and Capable of insight      Endings: None Reported    Modes of Intervention: Education, Support, Socialization, Exploration, Clarifying, and Problem-solving      Discipline Responsible: /Counselor      Signature:   SPARKLE Hernández, ROSENDO

## 2023-10-23 NOTE — BH NOTE
4 Eyes Skin Assessment     NAME:  Chuck Ramirez  YOB: 1990  MEDICAL RECORD NUMBER:  [id number]    The patient is being assessed for  Admission    I agree that at least one RN has performed a thorough Head to Toe Skin Assessment on the patient. ALL assessment sites listed below have been assessed. Areas assessed by both nurses:    Head, Face, Ears, Shoulders, Back, Chest, Arms, Elbows, Hands, Sacrum. Buttock, Coccyx, Ischium, and Legs. Feet and Heels        Does the Patient have a Wound?  No noted wound(s)       Miguel Prevention initiated by RN: No  Wound Care Orders initiated by RN: No    Pressure Injury (Stage 3,4, Unstageable, DTI, NWPT, and Complex wounds) if present, place Wound referral order by RN under : No    New Ostomies, if present place, Ostomy referral order under : No     Nurse 1 eSignature: Electronically signed by Lita Marcial RN on 10/18/23 at 1:25 AM EDT    **SHARE this note so that the co-signing nurse can place an eSignature**    Nurse 2 eSignature: Electronically signed by Jorge Polanco RN on 10/18/23 at 2:19 AM EDT
[facility]  Admission Note     33 y.o. male came up from the [facility] via wheelchair. Patient states that he is here per recommendation from his . Patient admits to having both auditory and visual hallucinations that started around 2 months ago. Patient states, \"I hear things that kind of give me signs to do this or do that. Sometimes its names, states, or things I heard throughout the day. I usually hear one word at a time and then I fill in the rest in my head. I kind of like it though\". States that he also \"sees things in my peripheral\". Patient admits to recent use of amphetamines and marijuana before coming to the hospital. Patient also tested positive for Buprenorphine. Patient recently released from [facility], states he was there for 3 1/2 weeks. Patient is currently unemployed and reports that he goes back and forth living with certain friends since being out of [facility]. Patient is on probation for a trespassing misdemeanor in [facility] and also is on probation in [facility] for theft charge. Currently denies anxiety. Rates depression a 5/10 due to current situation but states that he has always had depression. Denies having any support system, states that he no longer talks to his family. Denies suicidal ideation, homicidal ideation, and hallucinations. Refills medications at [facility] on [address] but denies any home medications. Patient is flat, depressed. Fair eye contact. Cooperative during admission, slightly preoccupied. Patient did voice feelings of hopelessness related to recent police charges and drug use. Patient oriented to room/unit. All consents signed. Supplies provided and placed in room. Will continue to support and monitor. Purposeful rounding continued. Admission Type:   Admission Type:  Involuntary    Reason for admission:  Reason for Admission: \"I came in for my mental health, my  recommeneded it\"      Addictive Behavior:   Addictive
Patient resting in bed with eyes closed. Respirations even and unlabored. No signs of distress. Purposeful rounding continued.
Cooperative  Sexual Misconduct History: Current - no  Preception: [place] to person, [place] to time, [place] to place, [place] to situation  Attention:Normal: No  Attention: Distractible  Thought Processes: Circumstantial  Thought Content:Normal: No  Thought Content: Preoccupations  Depression Symptoms: Impaired concentration  Anxiety Symptoms: Generalized  Deisi Symptoms: No problems reported or observed.   Hallucinations: None  Delusions: No  Memory:Normal: Yes  Memory: Poor recent  Insight and Judgment: No  Insight and Judgment: Poor insight    Valerie Levine RN

## 2023-10-26 ENCOUNTER — HOSPITAL ENCOUNTER (EMERGENCY)
Age: 33
Discharge: HOME | End: 2023-10-26

## 2023-10-26 ENCOUNTER — HOSPITAL ENCOUNTER (EMERGENCY)
Age: 33
Discharge: HOME | DRG: 773 | End: 2023-10-26
Attending: EMERGENCY MEDICINE
Payer: MEDICAID

## 2023-10-26 VITALS
WEIGHT: 135 LBS | HEIGHT: 68 IN | BODY MASS INDEX: 20.46 KG/M2 | SYSTOLIC BLOOD PRESSURE: 133 MMHG | OXYGEN SATURATION: 100 % | TEMPERATURE: 97.2 F | RESPIRATION RATE: 16 BRPM | HEART RATE: 74 BPM | DIASTOLIC BLOOD PRESSURE: 75 MMHG

## 2023-10-26 VITALS — RESPIRATION RATE: 16 BRPM | HEART RATE: 73 BPM | OXYGEN SATURATION: 98 % | TEMPERATURE: 97.8 F

## 2023-10-26 DIAGNOSIS — S90.424A BLISTER OF TOE OF RIGHT FOOT WITHOUT INFECTION, INITIAL ENCOUNTER: Primary | ICD-10-CM

## 2023-10-26 PROCEDURE — 99282 EMERGENCY DEPT VISIT SF MDM: CPT

## 2023-10-26 ASSESSMENT — PAIN DESCRIPTION - DESCRIPTORS: DESCRIPTORS: SHOOTING

## 2023-10-26 ASSESSMENT — PATIENT HEALTH QUESTIONNAIRE - PHQ9
SUM OF ALL RESPONSES TO PHQ QUESTIONS 1-9: 0
1. LITTLE INTEREST OR PLEASURE IN DOING THINGS: 0
SUM OF ALL RESPONSES TO PHQ QUESTIONS 1-9: 0
2. FEELING DOWN, DEPRESSED OR HOPELESS: 0
SUM OF ALL RESPONSES TO PHQ QUESTIONS 1-9: 0
SUM OF ALL RESPONSES TO PHQ QUESTIONS 1-9: 0
SUM OF ALL RESPONSES TO PHQ9 QUESTIONS 1 & 2: 0

## 2023-10-26 ASSESSMENT — PAIN SCALES - GENERAL: PAINLEVEL_OUTOF10: 9

## 2023-10-26 ASSESSMENT — PAIN DESCRIPTION - ORIENTATION: ORIENTATION: RIGHT

## 2023-10-26 ASSESSMENT — PAIN - FUNCTIONAL ASSESSMENT: PAIN_FUNCTIONAL_ASSESSMENT: 0-10

## 2023-10-26 ASSESSMENT — LIFESTYLE VARIABLES: HOW OFTEN DO YOU HAVE A DRINK CONTAINING ALCOHOL: NEVER

## 2023-10-28 ENCOUNTER — HOSPITAL ENCOUNTER (INPATIENT)
Age: 33
LOS: 9 days | Discharge: HOME | DRG: 773 | End: 2023-11-06
Attending: EMERGENCY MEDICINE | Admitting: PSYCHIATRY & NEUROLOGY
Payer: MEDICAID

## 2023-10-28 DIAGNOSIS — R45.851 SUICIDAL IDEATION: Primary | ICD-10-CM

## 2023-10-28 LAB
ALBUMIN SERPL-MCNC: 3.9 G/DL (ref 3.5–5.2)
ALP SERPL-CCNC: 62 U/L (ref 40–129)
ALT SERPL-CCNC: 58 U/L (ref 0–40)
AMPHET UR QL SCN: POSITIVE
ANION GAP SERPL CALCULATED.3IONS-SCNC: 11 MMOL/L (ref 7–16)
APAP SERPL-MCNC: <5 UG/ML (ref 10–30)
AST SERPL-CCNC: 30 U/L (ref 0–39)
BARBITURATES UR QL SCN: NEGATIVE
BASOPHILS # BLD: 0.18 K/UL (ref 0–0.2)
BASOPHILS NFR BLD: 2 % (ref 0–2)
BENZODIAZ UR QL: NEGATIVE
BILIRUB SERPL-MCNC: 0.5 MG/DL (ref 0–1.2)
BUN SERPL-MCNC: 14 MG/DL (ref 6–20)
BUPRENORPHINE UR QL: POSITIVE
CALCIUM SERPL-MCNC: 8.8 MG/DL (ref 8.6–10.2)
CANNABINOIDS UR QL SCN: POSITIVE
CHLORIDE SERPL-SCNC: 101 MMOL/L (ref 98–107)
CO2 SERPL-SCNC: 26 MMOL/L (ref 22–29)
COCAINE UR QL SCN: NEGATIVE
CREAT SERPL-MCNC: 0.7 MG/DL (ref 0.7–1.2)
EOSINOPHIL # BLD: 0.18 K/UL (ref 0.05–0.5)
EOSINOPHILS RELATIVE PERCENT: 2 % (ref 0–6)
ERYTHROCYTE [DISTWIDTH] IN BLOOD BY AUTOMATED COUNT: 13.3 % (ref 11.5–15)
ETHANOLAMINE SERPL-MCNC: <10 MG/DL
FENTANYL UR QL: POSITIVE
GFR SERPL CREATININE-BSD FRML MDRD: >60 ML/MIN/1.73M2
GLUCOSE SERPL-MCNC: 77 MG/DL (ref 74–99)
HCT VFR BLD AUTO: 35 % (ref 37–54)
HGB BLD-MCNC: 11.7 G/DL (ref 12.5–16.5)
LYMPHOCYTES NFR BLD: 1.34 K/UL (ref 1.5–4)
LYMPHOCYTES RELATIVE PERCENT: 13 % (ref 20–42)
MCH RBC QN AUTO: 29.6 PG (ref 26–35)
MCHC RBC AUTO-ENTMCNC: 33.4 G/DL (ref 32–34.5)
MCV RBC AUTO: 88.6 FL (ref 80–99.9)
METHADONE UR QL: NEGATIVE
MONOCYTES NFR BLD: 0.27 K/UL (ref 0.1–0.95)
MONOCYTES NFR BLD: 3 % (ref 2–12)
NEUTROPHILS NFR BLD: 80 % (ref 43–80)
NEUTS SEG NFR BLD: 8.14 K/UL (ref 1.8–7.3)
OPIATES UR QL SCN: NEGATIVE
OXYCODONE UR QL SCN: NEGATIVE
PCP UR QL SCN: NEGATIVE
PLATELET # BLD AUTO: 299 K/UL (ref 130–450)
PMV BLD AUTO: 8.9 FL (ref 7–12)
POTASSIUM SERPL-SCNC: 4.1 MMOL/L (ref 3.5–5)
PROMYELOCYTES ABSOLUTE COUNT: 0.09 K/UL
PROMYELOCYTES: 1 %
PROT SERPL-MCNC: 6.4 G/DL (ref 6.4–8.3)
RBC # BLD AUTO: 3.95 M/UL (ref 3.8–5.8)
RBC # BLD: ABNORMAL 10*6/UL
SALICYLATES SERPL-MCNC: <0.3 MG/DL (ref 0–30)
SODIUM SERPL-SCNC: 138 MMOL/L (ref 132–146)
TEST INFORMATION: ABNORMAL
TOXIC TRICYCLIC SC,BLOOD: NEGATIVE
WBC OTHER # BLD: 10.2 K/UL (ref 4.5–11.5)

## 2023-10-28 PROCEDURE — G0480 DRUG TEST DEF 1-7 CLASSES: HCPCS

## 2023-10-28 PROCEDURE — 80179 DRUG ASSAY SALICYLATE: CPT

## 2023-10-28 PROCEDURE — 80307 DRUG TEST PRSMV CHEM ANLYZR: CPT

## 2023-10-28 PROCEDURE — 1240000000 HC EMOTIONAL WELLNESS R&B

## 2023-10-28 PROCEDURE — 93005 ELECTROCARDIOGRAM TRACING: CPT | Performed by: EMERGENCY MEDICINE

## 2023-10-28 PROCEDURE — 6370000000 HC RX 637 (ALT 250 FOR IP): Performed by: PSYCHIATRY & NEUROLOGY

## 2023-10-28 PROCEDURE — 85025 COMPLETE CBC W/AUTO DIFF WBC: CPT

## 2023-10-28 PROCEDURE — 80053 COMPREHEN METABOLIC PANEL: CPT

## 2023-10-28 PROCEDURE — 99285 EMERGENCY DEPT VISIT HI MDM: CPT

## 2023-10-28 PROCEDURE — 80143 DRUG ASSAY ACETAMINOPHEN: CPT

## 2023-10-28 RX ORDER — HYDROXYZINE PAMOATE 50 MG/1
50 CAPSULE ORAL 3 TIMES DAILY PRN
Status: DISCONTINUED | OUTPATIENT
Start: 2023-10-28 | End: 2023-11-06 | Stop reason: HOSPADM

## 2023-10-28 RX ORDER — LANOLIN ALCOHOL/MO/W.PET/CERES
3 CREAM (GRAM) TOPICAL NIGHTLY PRN
Status: DISCONTINUED | OUTPATIENT
Start: 2023-10-28 | End: 2023-11-06 | Stop reason: HOSPADM

## 2023-10-28 RX ORDER — HALOPERIDOL 5 MG/1
5 TABLET ORAL EVERY 6 HOURS PRN
Status: DISCONTINUED | OUTPATIENT
Start: 2023-10-28 | End: 2023-11-06 | Stop reason: HOSPADM

## 2023-10-28 RX ORDER — HALOPERIDOL 5 MG/ML
5 INJECTION INTRAMUSCULAR EVERY 6 HOURS PRN
Status: DISCONTINUED | OUTPATIENT
Start: 2023-10-28 | End: 2023-11-06 | Stop reason: HOSPADM

## 2023-10-28 RX ORDER — ACETAMINOPHEN 325 MG/1
650 TABLET ORAL EVERY 6 HOURS PRN
Status: DISCONTINUED | OUTPATIENT
Start: 2023-10-28 | End: 2023-11-06 | Stop reason: HOSPADM

## 2023-10-28 RX ORDER — NICOTINE 21 MG/24HR
1 PATCH, TRANSDERMAL 24 HOURS TRANSDERMAL DAILY
Status: DISCONTINUED | OUTPATIENT
Start: 2023-10-29 | End: 2023-11-02

## 2023-10-28 RX ORDER — MAGNESIUM HYDROXIDE/ALUMINUM HYDROXICE/SIMETHICONE 120; 1200; 1200 MG/30ML; MG/30ML; MG/30ML
30 SUSPENSION ORAL PRN
Status: DISCONTINUED | OUTPATIENT
Start: 2023-10-28 | End: 2023-11-06 | Stop reason: HOSPADM

## 2023-10-28 RX ADMIN — ACETAMINOPHEN 650 MG: 325 TABLET ORAL at 23:15

## 2023-10-28 ASSESSMENT — SLEEP AND FATIGUE QUESTIONNAIRES
DO YOU USE A SLEEP AID: NO
AVERAGE NUMBER OF SLEEP HOURS: 5
SLEEP PATTERN: DIFFICULTY FALLING ASLEEP
DO YOU HAVE DIFFICULTY SLEEPING: YES

## 2023-10-28 ASSESSMENT — PAIN DESCRIPTION - ORIENTATION: ORIENTATION: MID

## 2023-10-28 ASSESSMENT — PAIN SCALES - GENERAL: PAINLEVEL_OUTOF10: 5

## 2023-10-28 ASSESSMENT — LIFESTYLE VARIABLES
HOW OFTEN DO YOU HAVE A DRINK CONTAINING ALCOHOL: MONTHLY OR LESS
HOW MANY STANDARD DRINKS CONTAINING ALCOHOL DO YOU HAVE ON A TYPICAL DAY: 1 OR 2
HOW OFTEN DO YOU HAVE A DRINK CONTAINING ALCOHOL: NEVER

## 2023-10-28 ASSESSMENT — PAIN - FUNCTIONAL ASSESSMENT: PAIN_FUNCTIONAL_ASSESSMENT: ACTIVITIES ARE NOT PREVENTED

## 2023-10-28 ASSESSMENT — PAIN DESCRIPTION - LOCATION: LOCATION: HEAD

## 2023-10-28 ASSESSMENT — PAIN DESCRIPTION - DESCRIPTORS: DESCRIPTORS: ACHING

## 2023-10-28 NOTE — ED NOTES
Pt care report received from RN Cullen Coon, assumed Pt care at this time. Pt is currently calm with no complaints at this time.       Piero Molina RN  10/28/23 1919

## 2023-10-28 NOTE — ED NOTES
Pt resting comfortably showing no signs of distress or agitation.       [facility] Garzon RN  10/28/23 1958

## 2023-10-28 NOTE — ED NOTES
Pt resting comfortably showing no signs of distress or agitation.       Anju Zhang RN  10/28/23 1958

## 2023-10-28 NOTE — ED NOTES
Behavioral Health Crisis Assessment      Chief Complaint: The pt presented to the ED stating that he wished he was dead and he wishes someone would kill him because he is afraid to do it himself. Mental Status Exam: The pt presents calm and cooperative with a flat affect and depressed mood. He is oriented times 4 and is a fair historian. He reported that he occasionally hears mumbling and sees shadows. He reported current SI- denied HI. He has poor judgement and insight. He has fair hygiene and poor eye contact. Legal Status:  [] Voluntary:  [x] Involuntary, Issued by: ED doc    Gender:  [x] Male [] Female [] Transgender  [] Other    Sexual Orientation:  [x] Heterosexual [] Homosexual [] Bisexual [] Other    Brief Clinical Summary:  The pt is a 33 yr old white male who presents to the ED due to depression. He stated that he has been loosing hope and fears he will not come back from it. He stated that he wishes someone would shoot him. The pt was discharged from psych 5 days ago and stated that he wishes he would not have left. He stated that he stopped his meds 2 days ago because they made him shake. He reported difficulty falling and staying asleep. He reported a hx of a suicide attempt \"a few weeks ago while in longterm\" when he tried to hang himself but he stated that he was too tall for the noose. He denied a hx of other attempts. Initially the pt was denying that he was suicidal but kept stating that he wanted to be killed. When questioned further about this he admitted that he was afraid to say that he was suicidal because he thought we would put him in a \"turtle suit\" similar to the garment given in longterm when someone is suicidal.      The pt denied a hx of HI. He admitted that he hears voices at times that are just mumbling and he has seen shadows. He reported other than last week he was admitted to [facility] 10 years ago. He denied that he sees an outpt provider.   He stated that

## 2023-10-29 PROBLEM — Z91.199 PERSONAL HISTORY OF NONCOMPLIANCE WITH MEDICAL TREATMENT, PRESENTING HAZARDS TO HEALTH: Status: ACTIVE | Noted: 2023-10-29

## 2023-10-29 PROCEDURE — 6370000000 HC RX 637 (ALT 250 FOR IP): Performed by: NURSE PRACTITIONER

## 2023-10-29 PROCEDURE — 90792 PSYCH DIAG EVAL W/MED SRVCS: CPT | Performed by: NURSE PRACTITIONER

## 2023-10-29 PROCEDURE — 6370000000 HC RX 637 (ALT 250 FOR IP): Performed by: PSYCHIATRY & NEUROLOGY

## 2023-10-29 PROCEDURE — 1240000000 HC EMOTIONAL WELLNESS R&B

## 2023-10-29 RX ORDER — OLANZAPINE 5 MG/1
5 TABLET ORAL NIGHTLY
Status: DISCONTINUED | OUTPATIENT
Start: 2023-10-29 | End: 2023-11-06 | Stop reason: HOSPADM

## 2023-10-29 RX ORDER — IBUPROFEN 400 MG/1
800 TABLET ORAL EVERY 8 HOURS PRN
Status: DISCONTINUED | OUTPATIENT
Start: 2023-10-29 | End: 2023-11-06 | Stop reason: HOSPADM

## 2023-10-29 RX ORDER — CLONIDINE HYDROCHLORIDE 0.1 MG/1
0.1 TABLET ORAL 3 TIMES DAILY PRN
Status: DISCONTINUED | OUTPATIENT
Start: 2023-10-29 | End: 2023-11-06 | Stop reason: HOSPADM

## 2023-10-29 RX ORDER — DICYCLOMINE HYDROCHLORIDE 10 MG/1
10 CAPSULE ORAL 4 TIMES DAILY PRN
Status: DISCONTINUED | OUTPATIENT
Start: 2023-10-29 | End: 2023-11-06 | Stop reason: HOSPADM

## 2023-10-29 RX ORDER — DIVALPROEX SODIUM 250 MG/1
250 TABLET, DELAYED RELEASE ORAL EVERY 12 HOURS SCHEDULED
Status: DISCONTINUED | OUTPATIENT
Start: 2023-10-29 | End: 2023-11-03

## 2023-10-29 RX ADMIN — DIVALPROEX SODIUM 250 MG: 250 TABLET, DELAYED RELEASE ORAL at 12:32

## 2023-10-29 RX ADMIN — MELATONIN 3 MG ORAL TABLET 3 MG: 3 TABLET ORAL at 21:23

## 2023-10-29 RX ADMIN — DIVALPROEX SODIUM 250 MG: 250 TABLET, DELAYED RELEASE ORAL at 21:23

## 2023-10-29 RX ADMIN — OLANZAPINE 5 MG: 5 TABLET, FILM COATED ORAL at 21:23

## 2023-10-29 ASSESSMENT — LIFESTYLE VARIABLES
HOW OFTEN DO YOU HAVE A DRINK CONTAINING ALCOHOL: NEVER
HOW MANY STANDARD DRINKS CONTAINING ALCOHOL DO YOU HAVE ON A TYPICAL DAY: PATIENT DOES NOT DRINK

## 2023-10-29 ASSESSMENT — SLEEP AND FATIGUE QUESTIONNAIRES
SLEEP PATTERN: DIFFICULTY FALLING ASLEEP
DO YOU USE A SLEEP AID: NO
DO YOU HAVE DIFFICULTY SLEEPING: YES

## 2023-10-29 ASSESSMENT — PATIENT HEALTH QUESTIONNAIRE - PHQ9
SUM OF ALL RESPONSES TO PHQ QUESTIONS 1-9: 2
1. LITTLE INTEREST OR PLEASURE IN DOING THINGS: 1
SUM OF ALL RESPONSES TO PHQ QUESTIONS 1-9: 2
2. FEELING DOWN, DEPRESSED OR HOPELESS: 1
SUM OF ALL RESPONSES TO PHQ9 QUESTIONS 1 & 2: 2

## 2023-10-29 NOTE — H&P
Department of Psychiatry  History and Physical - Adult     CHIEF COMPLAINT:  \" The psych meds make me have seizures\"    Patient was seen after discussing with the treatment team and reviewing the chart    CIRCUMSTANCES OF ADMISSION:   The pt presented to the ED stating that he wished he was dead and he wishes someone would kill him because he is afraid to do it himself. Pink slip for suicidal ideation  HISTORY OF PRESENT ILLNESS:      The patient is a 33 y.o. male with significant past history of polysubstance abuse and dependence, with 1 past inpatient psychiatric hospitalization here 10/18/2023. Again presents to [facility] emergency department reporting that he wishes he were dead and that someone would kill him because he is afraid to kill himself he is hopeful that someone will shoot him. He was pink slip for suicidal ideations. Patient has a primary substance abuse and dependence issue, he is also now homeless. He was medically cleared while in the emergency department, his UDS in the emergency department is positive for methamphetamine, marijuana, fentanyl, and buprenorphine. QTc, 450. He was admitted to [facility] for psychiatric evaluation and stabilization. On evaluation today with the psychiatrist the patient is minimally cooperative, he does not uncover his face from his blanket. He states that the medications caused him seizures and he does not want any medications like that anymore. He is not endorsing any psychiatric issues to us. He is preoccupied with his withdrawal states that he has been using fentanyl. He offers little other conversation. Clearly has very poor insight into his substance abuse problems. He is not describing any psychiatric issues on this encounter and does not seem to tolerate psychiatric evaluation at this time as he is again very preoccupied with his withdrawal symptoms. Insight judgment impulse control are limited.   Alert oriented to person place time

## 2023-10-29 NOTE — ED NOTES
Pt states that he is unable to urinate at this time and requested water. Pt provided two cups of water.       Lorin Arzate RN  10/28/23 2010

## 2023-10-29 NOTE — CARE COORDINATION
Biopsychosocial Assessment Note    Social work met with patient to complete the biopsychosocial assessment and C-SSRS. Chief Complaint: \"My depression has been bad\". Mental Status Exam: Pt presents as A&Ox4, cooperative with fair eye contact. He is depressed and anxious with congruent affect. Pt thoughts circumstantial and preoccupied, impoverished. Pt insight/judgement are poor. Pt denied SI/HI/AVH. Clinical Summary: Pt reports that he is here because of his depression. He reports that he has been having SI with no plan/intent. He states that his trigger for this SI is \"life in general\", is unable to identify specific stressors. He was recently discharged from [facility] 10/23/23 and states that he has not taken mental health medications since because they \"do not work\". Pt reports that he has a hx of inpatient psychiatric treatment, last being at [facility] 10/18/23. He states that he is not active outpatient for mental health services. He denied any hx of suicide attempts and states that his SI has been lasting for a couple weeks now. He states that he has no trauma/abuse hx. He denied a legal hx, though per chart, \" he was recently charged with misdemeanor for trespassing and menacing, that he was released from shelter . .. On probation in [facility]\". Pt reports that he used heroin 1x, that it was the day before yesterday. Per chart, pt was sober from heroin for about 6 months prior to this relapse. Pt states that he may want inpatient rehab for this use. Pt denied any family hx of mental health or suicide. Pt states that he has been staying at different family and friends houses. He states that he is single, has no children, not close with his parents, and not close with his siblings. Pt reports that he completed high school, is unemployed, and has no source of income.  Pt admits that he has been feeling hopeless/helpless and that he has had little interest/pleasure in doing things

## 2023-10-29 NOTE — ED NOTES
Pt care report called to [facility] of [address], who had no further questions at this time. Transport paged.       Dewey Rolon RN  10/28/23 1206

## 2023-10-29 NOTE — ED PROVIDER NOTES
Department of Emergency Medicine   ED  Provider Note  Admit Date/RoomTime: 10/28/2023  4:41 PM  ED Room: [address] [place] [address]        Written by: Yessy Soler DO  Patient Name: Chuck Ramirez  Attending Provider: Ozie Leventhal Date: 10/28/2023  4:41 PM  MRN: [id number]    : 1990      History of Present Illness:  10/28/23, Time: 9:36 PM EDT  Chief Complaint   Patient presents with    Psychiatric Evaluation     Pt recently seen for anxiety and depression- pt states he feels like he is \"going manic\" and went off meds because \"they weren't helping\" Denies SI/HI           HPI   Patient is a 33 y.o. male with a past medical history of psychosis, mood disorder, presenting to the Emergency Department for psych evaluation. History obtained by patient. Patient presents for psychiatric evaluation. He states that he is all in his head. He states he cannot remember think straightly. He states that he was trying to think what he was doing yesterday and could not figure it out which concerned him. He states that he is hearing voices. He denies any visual hallucinations. Initially to me he denies any SI or HI. On repeat evaluation he states he was someone would shoot himself because he cannot do it himself. He was recently admitted for psychiatric evaluation and started on medication. He states he has not taken his medication in over 2 or 3 days as he has lost        Review of Systems:   A complete review of systems was performed and pertinent positives and negatives are stated within HPI, all other systems reviewed and are negative.        --------------------------------------------- PAST HISTORY ---------------------------------------------  Past Medical History:  has a past medical history of Opioid use disorder. Past Surgical History:  has a past surgical history that includes hernia repair (Left, 10/9/2019). Social History:  reports that he has been smoking cigarettes.  He started

## 2023-10-29 NOTE — ED NOTES
Pt presented to Dr. Shahid Obregon at this time. Pt was admitted for Mood disorder and Poly substance abuse.       Lorin Mckeon RN  10/28/23 0626

## 2023-10-29 NOTE — ED NOTES
The pt was accepted to [facility]. Disposition called to [facility] in admitting.        Danay Fuentes, [facility]  10/28/23 6925

## 2023-10-29 NOTE — DISCHARGE INSTRUCTIONS
Attending Provider: Priya Amaral MD.     If you have any questions and need to contact this individual please call the unit at [phone]. [facility] Provider will be available on call 24/7 and during holidays. Reason for Admission:The pt presented to the ED stating that he wished he was dead and he wishes someone would kill him because he is afraid to do it himself. Pink slip for suicidal ideation    Follow up for Tobacco Cessation at:    [facility]                                 Date:  Friday 11/10 at [address]   ([address] [place] [address] B elevators to 7th floor)   Phone: [phone]   Fax: [phone]

## 2023-10-29 NOTE — FLOWSHEET NOTE
Pt presents to the ED secondary to non compliancy with psychiatric medications, anxiety/depression and suicidal ideation. Pt states that that he wishes someone would shoot him. Pt stopped taking his psychiatric medication two days ago due to the side effects. Pt denies any chest pain, SOB or dizziness. Pt is speaking in full sentences showing no signs of distress. Pt denies any homicidal ideation and has no further complaints at this time.

## 2023-10-30 LAB
EKG ATRIAL RATE: 53 BPM
EKG P AXIS: 38 DEGREES
EKG P-R INTERVAL: 128 MS
EKG Q-T INTERVAL: 480 MS
EKG QRS DURATION: 92 MS
EKG QTC CALCULATION (BAZETT): 450 MS
EKG R AXIS: 91 DEGREES
EKG T AXIS: 74 DEGREES
EKG VENTRICULAR RATE: 53 BPM

## 2023-10-30 PROCEDURE — 99232 SBSQ HOSP IP/OBS MODERATE 35: CPT | Performed by: NURSE PRACTITIONER

## 2023-10-30 PROCEDURE — 1240000000 HC EMOTIONAL WELLNESS R&B

## 2023-10-30 PROCEDURE — 6370000000 HC RX 637 (ALT 250 FOR IP): Performed by: PSYCHIATRY & NEUROLOGY

## 2023-10-30 PROCEDURE — 6370000000 HC RX 637 (ALT 250 FOR IP): Performed by: NURSE PRACTITIONER

## 2023-10-30 PROCEDURE — 93010 ELECTROCARDIOGRAM REPORT: CPT | Performed by: INTERNAL MEDICINE

## 2023-10-30 RX ADMIN — MELATONIN 3 MG ORAL TABLET 3 MG: 3 TABLET ORAL at 22:11

## 2023-10-30 RX ADMIN — DIVALPROEX SODIUM 250 MG: 250 TABLET, DELAYED RELEASE ORAL at 22:11

## 2023-10-30 RX ADMIN — ACETAMINOPHEN 650 MG: 325 TABLET ORAL at 19:58

## 2023-10-30 RX ADMIN — DIVALPROEX SODIUM 250 MG: 250 TABLET, DELAYED RELEASE ORAL at 09:20

## 2023-10-30 RX ADMIN — OLANZAPINE 5 MG: 5 TABLET, FILM COATED ORAL at 22:11

## 2023-10-30 ASSESSMENT — PAIN DESCRIPTION - ORIENTATION
ORIENTATION: OTHER (COMMENT)
ORIENTATION: MID;LOWER

## 2023-10-30 ASSESSMENT — PAIN DESCRIPTION - LOCATION
LOCATION: OTHER (COMMENT)
LOCATION: BACK

## 2023-10-30 ASSESSMENT — PAIN SCALES - GENERAL
PAINLEVEL_OUTOF10: 8
PAINLEVEL_OUTOF10: 7
PAINLEVEL_OUTOF10: 0
PAINLEVEL_OUTOF10: 0

## 2023-10-30 ASSESSMENT — PAIN DESCRIPTION - ONSET: ONSET: ON-GOING

## 2023-10-30 ASSESSMENT — PAIN DESCRIPTION - DESCRIPTORS
DESCRIPTORS: ACHING
DESCRIPTORS: ACHING

## 2023-10-30 ASSESSMENT — PAIN - FUNCTIONAL ASSESSMENT
PAIN_FUNCTIONAL_ASSESSMENT: ACTIVITIES ARE NOT PREVENTED
PAIN_FUNCTIONAL_ASSESSMENT: ACTIVITIES ARE NOT PREVENTED

## 2023-10-30 ASSESSMENT — PAIN DESCRIPTION - FREQUENCY: FREQUENCY: CONTINUOUS

## 2023-10-30 ASSESSMENT — PAIN DESCRIPTION - PAIN TYPE: TYPE: ACUTE PAIN

## 2023-10-31 PROCEDURE — 6370000000 HC RX 637 (ALT 250 FOR IP): Performed by: PSYCHIATRY & NEUROLOGY

## 2023-10-31 PROCEDURE — 6370000000 HC RX 637 (ALT 250 FOR IP): Performed by: NURSE PRACTITIONER

## 2023-10-31 PROCEDURE — 1240000000 HC EMOTIONAL WELLNESS R&B

## 2023-10-31 PROCEDURE — 99232 SBSQ HOSP IP/OBS MODERATE 35: CPT | Performed by: NURSE PRACTITIONER

## 2023-10-31 RX ADMIN — MELATONIN 3 MG ORAL TABLET 3 MG: 3 TABLET ORAL at 20:47

## 2023-10-31 RX ADMIN — HYDROXYZINE PAMOATE 50 MG: 50 CAPSULE ORAL at 20:47

## 2023-10-31 RX ADMIN — OLANZAPINE 5 MG: 5 TABLET, FILM COATED ORAL at 20:47

## 2023-10-31 RX ADMIN — IBUPROFEN 800 MG: 400 TABLET, FILM COATED ORAL at 20:47

## 2023-10-31 RX ADMIN — DIVALPROEX SODIUM 250 MG: 250 TABLET, DELAYED RELEASE ORAL at 20:47

## 2023-10-31 RX ADMIN — DIVALPROEX SODIUM 250 MG: 250 TABLET, DELAYED RELEASE ORAL at 09:08

## 2023-10-31 ASSESSMENT — PAIN DESCRIPTION - DESCRIPTORS: DESCRIPTORS: ACHING;DISCOMFORT;DULL

## 2023-10-31 ASSESSMENT — PAIN DESCRIPTION - LOCATION: LOCATION: FOOT

## 2023-10-31 ASSESSMENT — PAIN SCALES - GENERAL
PAINLEVEL_OUTOF10: 6
PAINLEVEL_OUTOF10: 0

## 2023-10-31 ASSESSMENT — PAIN DESCRIPTION - ORIENTATION: ORIENTATION: RIGHT;LEFT

## 2023-10-31 ASSESSMENT — PAIN - FUNCTIONAL ASSESSMENT: PAIN_FUNCTIONAL_ASSESSMENT: ACTIVITIES ARE NOT PREVENTED

## 2023-10-31 NOTE — CARE COORDINATION
SW met with pt to discuss his discharge. Pt observed sitting in the milieu. Pt reports feeling horrible today and wants to go home. SW asked where pt will go at time of discharge to which pt stated the streets. Pt reports he wants to go to the streets and \"hopefull get my head blown off. \" Pt not discuss his discharge plan further. Pt irritable, blunt, guarded, disheveled, with poor insight/judgement.

## 2023-10-31 NOTE — GROUP NOTE
Group Therapy Note    Date: 10/31/2023    Group Start Time: 1010  Group End Time: 3559  Group Topic: Psychoeducation    [facility] 7[facility] ACUTE BH 1    Timothy Martinez                                                                        Group Therapy Note    Date: 10/31/2023  Module Name:  id of stressors symptoms and daily hassles. Patient's Goal:  Patient will be able to id daily stressors and what protective factors he/she will work on   to manage crisis in the future. Notes:  Pleasant and sharing in group, able to id current daily hassles. Accepting of handouts. Status After Intervention:  Improved    Participation Level:  Active Listener and Interactive    Participation Quality: Appropriate, Attentive, and Sharing      Speech:  normal      Thought Process/Content: Logical      Affective Functioning: Congruent      Mood: euthymic      Level of consciousness:  Alert, Oriented x4, and Attentive      Response to Learning: Able to verbalize/acknowledge new learning, Able to retain information, and Progressing to goal      Endings: None Reported    Modes of Intervention: Education, Support, Socialization, Exploration, and Problem-solving      Discipline Responsible: Psychoeducational Specialist      Signature:  Azra Bliss

## 2023-10-31 NOTE — GROUP NOTE
Group Therapy Note    Date: 10/31/2023    Group Start Time: 1400  Group End Time: 1440  Group Topic: Recovery    [facility] 7SE ACUTE  1    [facility]                                                               Type of Group: Recovery    Wellness Binder Information  Module Name:  Recovery and Relapse Prevention   Patient's Goal:  Patient will be able to id local resources for recovery. Notes:  Pleasant and appeared to be an active listener in group. Status After Intervention:  Improved    Participation Level:  Active Listener and Interactive    Participation Quality: Appropriate and Attentive      Speech:  normal      Thought Process/Content: Logical      Affective Functioning: Congruent      Mood: euthymic      Level of consciousness:  Alert, Oriented x4, and Attentive      Response to Learning: Able to verbalize/acknowledge new learning and Able to retain information      Endings: None Reported    Modes of Intervention: Education, Support, and Clarifying      Discipline Responsible: Psychoeducational Specialist      Signature:  Americo Delaney

## 2023-11-01 PROCEDURE — 6370000000 HC RX 637 (ALT 250 FOR IP): Performed by: PSYCHIATRY & NEUROLOGY

## 2023-11-01 PROCEDURE — 1240000000 HC EMOTIONAL WELLNESS R&B

## 2023-11-01 PROCEDURE — 6370000000 HC RX 637 (ALT 250 FOR IP): Performed by: NURSE PRACTITIONER

## 2023-11-01 PROCEDURE — 99232 SBSQ HOSP IP/OBS MODERATE 35: CPT | Performed by: NURSE PRACTITIONER

## 2023-11-01 RX ADMIN — DIVALPROEX SODIUM 250 MG: 250 TABLET, DELAYED RELEASE ORAL at 21:39

## 2023-11-01 RX ADMIN — DIVALPROEX SODIUM 250 MG: 250 TABLET, DELAYED RELEASE ORAL at 08:40

## 2023-11-01 RX ADMIN — OLANZAPINE 5 MG: 5 TABLET, FILM COATED ORAL at 21:39

## 2023-11-01 RX ADMIN — IBUPROFEN 800 MG: 400 TABLET, FILM COATED ORAL at 21:39

## 2023-11-01 RX ADMIN — ACETAMINOPHEN 650 MG: 325 TABLET ORAL at 16:57

## 2023-11-01 RX ADMIN — ACETAMINOPHEN 650 MG: 325 TABLET ORAL at 08:40

## 2023-11-01 RX ADMIN — HYDROXYZINE PAMOATE 50 MG: 50 CAPSULE ORAL at 08:40

## 2023-11-01 RX ADMIN — MELATONIN 3 MG ORAL TABLET 3 MG: 3 TABLET ORAL at 21:39

## 2023-11-01 RX ADMIN — HYDROXYZINE PAMOATE 50 MG: 50 CAPSULE ORAL at 16:57

## 2023-11-01 ASSESSMENT — PAIN DESCRIPTION - LOCATION
LOCATION: BACK

## 2023-11-01 ASSESSMENT — PAIN SCALES - GENERAL
PAINLEVEL_OUTOF10: 6
PAINLEVEL_OUTOF10: 7
PAINLEVEL_OUTOF10: 7

## 2023-11-01 ASSESSMENT — PAIN DESCRIPTION - ORIENTATION
ORIENTATION: LOWER
ORIENTATION: MID
ORIENTATION: LOWER

## 2023-11-01 ASSESSMENT — PAIN DESCRIPTION - DESCRIPTORS
DESCRIPTORS: DISCOMFORT;ACHING
DESCRIPTORS: ACHING;SORE
DESCRIPTORS: ACHING;THROBBING

## 2023-11-01 ASSESSMENT — PAIN - FUNCTIONAL ASSESSMENT: PAIN_FUNCTIONAL_ASSESSMENT: ACTIVITIES ARE NOT PREVENTED

## 2023-11-01 NOTE — GROUP NOTE
Group Therapy Note    Date: 11/1/2023    Group Start Time: 8627  Group End Time: 5843  Group Topic: Psychoeducation    [facility] 7[facility] ACUTE BH 1    Timothy Ontiveros                                                                        Group Therapy Note    Date: 11/1/2023    Type of Group: Psychoeducation    Wellness Binder Information  Module Name:  coping skills for self management. Patient's Goal:  Patient will be able to id different coping skills, to manage self stressors. Notes:  Patient pleasant and engaged in group. Appeared to be an active listener in group. Status After Intervention:  Improved    Participation Level:  Active Listener and Interactive    Participation Quality: Appropriate, Attentive, and Sharing      Speech:  normal      Thought Process/Content: Logical      Affective Functioning: Congruent      Mood: euthymic      Level of consciousness:  Alert, Oriented x4, and Attentive      Response to Learning: Able to verbalize/acknowledge new learning, Able to retain information, and Progressing to goal      Endings: None Reported    Modes of Intervention: Education, Support, Socialization, and Problem-solving      Discipline Responsible: Psychoeducational Specialist      Signature:  Timothy Ontiveros     Group Therapy Note    Attendees: 25

## 2023-11-01 NOTE — GROUP NOTE
Group Therapy Note    Date: 11/1/2023    Group Start Time: 3190  Group End Time: 1600  Group Topic: Relaxation    [facility]    Mckenna Ontiveros        Group Therapy Note    Attendees: 9                                                                      Group Therapy Note    Date: 11/1/2023    Type of Group: Recreational    Wellness Binder Information  Module Name:  patients choice     Patient's Goal:  Patient will be able to engage in recreation activity to promote relaxation. Notes:  Pleasant and engaged in movie, word finds or art therapy. Status After Intervention:  Improved    Participation Level:  Active Listener and Interactive    Participation Quality: Appropriate, Attentive, and Sharing      Speech:  normal      Thought Process/Content: Logical      Affective Functioning: Congruent      Mood: euthymic      Level of consciousness:  Alert, Oriented x4, and Attentive      Response to Learning: Able to verbalize/acknowledge new learning, Able to retain information, and Progressing to goal      Endings: None Reported    Modes of Intervention: Education, Support, Socialization, Exploration, and Media      Discipline Responsible: Psychoeducational Specialist      Signature:  Sandrine Shrestha

## 2023-11-01 NOTE — CARE COORDINATION
Pt was seen during treatment team. Pt states that he is doing ok today. He denied SI/HI/AVH, appears to be well groomed. He is calm and appropriate, brightens with conversation. He states that he is feeling better and hopeful to go to inpatient rehab for his substance use at discharge. He is hopeful to continue his treatment in the future and eventually go to a sober living. SW contacted [facility] [phone] and cancelled pt appointment for tomorrow.

## 2023-11-01 NOTE — GROUP NOTE
Group Therapy Note    Date: 11/1/2023    Group Start Time: 1100  Group End Time: 1150  Group Topic: Psychotherapy    [facility]    Minerva Mendez MSW, LSW        Group Therapy Note    Attendees: 6       Patient's Goal:  To increase social interaction and improve relationships with others. Notes:  Pt was attentive in group and was able to identify an agenda. They were also able to verbalize relating to others within the group. Status After Intervention:  Improved    Participation Level: Active Listener and Interactive    Participation Quality: Appropriate, Attentive, and Sharing      Speech:  normal      Thought Process/Content: Flight of ideas      Affective Functioning: Congruent      Mood: anxious      Level of consciousness:  Alert and Attentive      Response to Learning: Able to verbalize current knowledge/experience, Able to verbalize/acknowledge new learning, and Able to retain information      Endings: None Reported    Modes of Intervention: Education, Support, Socialization, Exploration, Clarifying, and Problem-solving      Discipline Responsible: /Counselor      Signature:   SPARKLE Welch LSW

## 2023-11-02 PROBLEM — F60.2 ANTISOCIAL PERSONALITY DISORDER (HCC): Status: ACTIVE | Noted: 2023-11-02

## 2023-11-02 LAB
DATE LAST DOSE: ABNORMAL
TME LAST DOSE: ABNORMAL H
VALPROATE SERPL-MCNC: 36 UG/ML (ref 50–100)
VANCOMYCIN DOSE: ABNORMAL MG

## 2023-11-02 PROCEDURE — 6370000000 HC RX 637 (ALT 250 FOR IP): Performed by: NURSE PRACTITIONER

## 2023-11-02 PROCEDURE — 99232 SBSQ HOSP IP/OBS MODERATE 35: CPT | Performed by: NURSE PRACTITIONER

## 2023-11-02 PROCEDURE — 80164 ASSAY DIPROPYLACETIC ACD TOT: CPT

## 2023-11-02 PROCEDURE — 36415 COLL VENOUS BLD VENIPUNCTURE: CPT

## 2023-11-02 PROCEDURE — 6370000000 HC RX 637 (ALT 250 FOR IP): Performed by: PSYCHIATRY & NEUROLOGY

## 2023-11-02 PROCEDURE — 1240000000 HC EMOTIONAL WELLNESS R&B

## 2023-11-02 RX ORDER — POLYETHYLENE GLYCOL 3350 17 G
2 POWDER IN PACKET (EA) ORAL
Status: DISCONTINUED | OUTPATIENT
Start: 2023-11-02 | End: 2023-11-06 | Stop reason: HOSPADM

## 2023-11-02 RX ADMIN — OLANZAPINE 5 MG: 5 TABLET, FILM COATED ORAL at 20:42

## 2023-11-02 RX ADMIN — MELATONIN 3 MG ORAL TABLET 3 MG: 3 TABLET ORAL at 22:26

## 2023-11-02 RX ADMIN — HYDROXYZINE PAMOATE 50 MG: 50 CAPSULE ORAL at 18:52

## 2023-11-02 RX ADMIN — DIVALPROEX SODIUM 250 MG: 250 TABLET, DELAYED RELEASE ORAL at 20:42

## 2023-11-02 RX ADMIN — DIVALPROEX SODIUM 250 MG: 250 TABLET, DELAYED RELEASE ORAL at 08:28

## 2023-11-02 ASSESSMENT — PAIN SCALES - GENERAL: PAINLEVEL_OUTOF10: 0

## 2023-11-02 NOTE — CARE COORDINATION
JUDAH met with pt to discuss signing IRLANDA for his mother. Pt found in his room, is flat and depressed with poor eye contact. He states that he is agreeable to signing IRLANDA for his mother Coleen Cobian . He states that he \"thinks\" he wants to go to rehab for his substance use at discharge and states that he has nowhere else to go. JUDAH contacted pt mother Coleen Cobian  (IRLANDA signed) to gain collateral. She states that she is unable to help pt, but is very worried for him. She states that pt has nowhere to stay, that a local shelter may be a good place for pt to go at discharge. She is very worried about pt being outside since it is cold now and wants to ensure he has somewhere safe to go. She cannot help him, but has concerns and wants to ensure he gets help he needs. She feels pt is a danger to himself and he has told her in the past he is going to kill himself. She denied pt access to guns/weapons to her knowledge. He told her a week ago that it was \"better for him if he \" and that he had plans to overdose.

## 2023-11-03 PROCEDURE — 6370000000 HC RX 637 (ALT 250 FOR IP): Performed by: NURSE PRACTITIONER

## 2023-11-03 PROCEDURE — 1240000000 HC EMOTIONAL WELLNESS R&B

## 2023-11-03 RX ORDER — DIVALPROEX SODIUM 250 MG/1
250 TABLET, DELAYED RELEASE ORAL DAILY
Status: DISCONTINUED | OUTPATIENT
Start: 2023-11-04 | End: 2023-11-06 | Stop reason: HOSPADM

## 2023-11-03 RX ORDER — OLANZAPINE 2.5 MG/1
2.5 TABLET ORAL DAILY
Status: DISCONTINUED | OUTPATIENT
Start: 2023-11-03 | End: 2023-11-06 | Stop reason: HOSPADM

## 2023-11-03 RX ORDER — DIVALPROEX SODIUM 500 MG/1
500 TABLET, DELAYED RELEASE ORAL
Status: DISCONTINUED | OUTPATIENT
Start: 2023-11-03 | End: 2023-11-06 | Stop reason: HOSPADM

## 2023-11-03 RX ADMIN — OLANZAPINE 2.5 MG: 2.5 TABLET, FILM COATED ORAL at 13:06

## 2023-11-03 RX ADMIN — DIVALPROEX SODIUM 250 MG: 250 TABLET, DELAYED RELEASE ORAL at 08:20

## 2023-11-03 RX ADMIN — NICOTINE POLACRILEX 2 MG: 2 LOZENGE ORAL at 20:31

## 2023-11-03 RX ADMIN — OLANZAPINE 5 MG: 5 TABLET, FILM COATED ORAL at 20:35

## 2023-11-03 RX ADMIN — NICOTINE POLACRILEX 2 MG: 2 LOZENGE ORAL at 14:52

## 2023-11-03 RX ADMIN — DIVALPROEX SODIUM 500 MG: 500 TABLET, DELAYED RELEASE ORAL at 20:35

## 2023-11-03 RX ADMIN — NICOTINE POLACRILEX 2 MG: 2 LOZENGE ORAL at 16:27

## 2023-11-03 RX ADMIN — NICOTINE POLACRILEX 2 MG: 2 LOZENGE ORAL at 17:59

## 2023-11-03 RX ADMIN — NICOTINE POLACRILEX 2 MG: 2 LOZENGE ORAL at 09:16

## 2023-11-03 ASSESSMENT — PAIN SCALES - GENERAL
PAINLEVEL_OUTOF10: 0
PAINLEVEL_OUTOF10: 0

## 2023-11-03 NOTE — CARE COORDINATION
Peer Recovery Support Note    Name: Jacquelyn Mayberry  Date: 11/3/2023    Chief Complaint   Patient presents with    Psychiatric Evaluation     Pt recently seen for anxiety and depression- pt states he feels like he is \"going manic\" and went off meds because \"they weren't helping\" Denies SI/HI       Peer Support met with patient. [x] Support and education provided  [x] Resources provided   [] Treatment referral:   [] Other:   [] Patient declined peer recovery services     Referred By: Nasreen Mott ()    Notes: Spoke with patient who admits to having substance/MH issues. Indecisive on whether or not he wants to go to inpatient treatment, but provided 12-step programming resources and patient was receptive and willing to attend meetings. Patient said he will take the weekend to decide what he wants to do in regards to his treatment upon release. Will follow up as patient gets closer to discharge.     Signed: Lili Buck, 11/3/2023

## 2023-11-03 NOTE — GROUP NOTE
Group Therapy Note    Date: 11/3/2023    Group Start Time: 5367  Group End Time: 1100  Group Topic: Psychoeducation    [facility]    Timothy Soriano                                                                        Group Therapy Note    Date: 11/3/2023  Start Time: 3098  End Time:  1100  Number of Participants: 9    Type of Group: Psychoeducation    Wellness Binder Information  Module Name:  Self-Love and Self-esteem Mad Libs    Patient's Goal:  ID one or two ways to improve self-esteem/self-love. Increase socialization amongst peers through self-esteem mad cee activity. Notes:  CTRS discused self-esteem and self-love and three ways to improve one's self-esteem. CTRS then facilitated self-esteem mad libs. Patient actively engaged in both discussion and activty and exhibited a receptive behavior to the information provided. Patient able to ID one way to improve self-esteem. Status After Intervention:  Improved    Participation Level:  Active Listener and Interactive    Participation Quality: Appropriate, Attentive, and Sharing      Speech:  normal      Thought Process/Content: Logical      Affective Functioning: Congruent      Mood: euthymic      Level of consciousness:  Alert and Attentive      Response to Learning: Able to verbalize current knowledge/experience, Able to verbalize/acknowledge new learning, and Able to retain information      Endings: None Reported    Modes of Intervention: Education, Support, Socialization, and Activity      Discipline Responsible: Psychoeducational Specialist      Signature:  Timothy Soriano

## 2023-11-03 NOTE — GROUP NOTE
Group Therapy Note    Date: 11/3/2023    Group Start Time: 1100  Group End Time: 1200  Group Topic: Psychotherapy    [facility]    iMnerva Mendez MSW, LSW        Group Therapy Note    Attendees: 5       Patient's Goal:  To increase social interaction and improve relationships with others. Notes:  Pt was attentive in group and was able to identify an agenda. They were also able to verbalize relating to others within the group. Status After Intervention:  Improved    Participation Level: Active Listener and Interactive    Participation Quality: Appropriate, Attentive, Sharing, and Supportive      Speech:  normal      Thought Process/Content: Logical      Affective Functioning: Congruent      Mood: anxious      Level of consciousness:  Alert, Oriented x4, and Attentive      Response to Learning: Able to verbalize current knowledge/experience, Able to verbalize/acknowledge new learning, Able to retain information, and Capable of insight      Endings: None Reported    Modes of Intervention: Education, Support, Socialization, Exploration, Clarifying, and Problem-solving      Discipline Responsible: /Counselor      Signature:   TheSPARKLE Saldivar LSW

## 2023-11-04 PROCEDURE — 99232 SBSQ HOSP IP/OBS MODERATE 35: CPT | Performed by: NURSE PRACTITIONER

## 2023-11-04 PROCEDURE — 6370000000 HC RX 637 (ALT 250 FOR IP): Performed by: NURSE PRACTITIONER

## 2023-11-04 PROCEDURE — 6370000000 HC RX 637 (ALT 250 FOR IP): Performed by: PSYCHIATRY & NEUROLOGY

## 2023-11-04 PROCEDURE — 1240000000 HC EMOTIONAL WELLNESS R&B

## 2023-11-04 RX ORDER — OLANZAPINE 5 MG/1
5 TABLET ORAL NIGHTLY
Qty: 30 TABLET | Refills: 0 | Status: SHIPPED | OUTPATIENT
Start: 2023-11-04 | End: 2023-12-04

## 2023-11-04 RX ORDER — DIVALPROEX SODIUM 250 MG/1
250 TABLET, DELAYED RELEASE ORAL DAILY
Qty: 30 TABLET | Refills: 0 | Status: SHIPPED | OUTPATIENT
Start: 2023-11-05 | End: 2023-12-05

## 2023-11-04 RX ORDER — POLYETHYLENE GLYCOL 3350 17 G
2 POWDER IN PACKET (EA) ORAL
Qty: 100 EACH | Refills: 3
Start: 2023-11-04 | End: 2023-12-04

## 2023-11-04 RX ORDER — DIVALPROEX SODIUM 500 MG/1
500 TABLET, DELAYED RELEASE ORAL
Qty: 30 TABLET | Refills: 0 | Status: SHIPPED | OUTPATIENT
Start: 2023-11-04 | End: 2023-12-04

## 2023-11-04 RX ORDER — OLANZAPINE 2.5 MG/1
2.5 TABLET ORAL DAILY
Qty: 30 TABLET | Refills: 0 | Status: SHIPPED | OUTPATIENT
Start: 2023-11-05 | End: 2023-12-05

## 2023-11-04 RX ADMIN — NICOTINE POLACRILEX 2 MG: 2 LOZENGE ORAL at 15:52

## 2023-11-04 RX ADMIN — HYDROXYZINE PAMOATE 50 MG: 50 CAPSULE ORAL at 13:36

## 2023-11-04 RX ADMIN — DIVALPROEX SODIUM 500 MG: 500 TABLET, DELAYED RELEASE ORAL at 21:34

## 2023-11-04 RX ADMIN — MELATONIN 3 MG ORAL TABLET 3 MG: 3 TABLET ORAL at 22:23

## 2023-11-04 RX ADMIN — NICOTINE POLACRILEX 2 MG: 2 LOZENGE ORAL at 13:09

## 2023-11-04 RX ADMIN — OLANZAPINE 5 MG: 5 TABLET, FILM COATED ORAL at 21:34

## 2023-11-04 RX ADMIN — OLANZAPINE 2.5 MG: 2.5 TABLET, FILM COATED ORAL at 08:52

## 2023-11-04 RX ADMIN — DIVALPROEX SODIUM 250 MG: 250 TABLET, DELAYED RELEASE ORAL at 08:51

## 2023-11-04 RX ADMIN — NICOTINE POLACRILEX 2 MG: 2 LOZENGE ORAL at 18:03

## 2023-11-04 RX ADMIN — NICOTINE POLACRILEX 2 MG: 2 LOZENGE ORAL at 21:27

## 2023-11-04 ASSESSMENT — PAIN SCALES - GENERAL: PAINLEVEL_OUTOF10: 0

## 2023-11-04 NOTE — GROUP NOTE
Group Therapy Note    Date: 11/4/2023    Group Start Time: 1364  Group End Time: 1440  Group Topic: Cognitive Skills    [facility]    SPARKLE Wick LSW        Group Therapy Note    Attendees: 11       Patient's Goal:  Pt will be able to verbalize understanding of what it means to wake up refreshed. Notes:  Pt made connections and participated in group. Status After Intervention:  Improved    Participation Level:  Active Listener and Interactive    Participation Quality: Appropriate, Attentive, Sharing, and Supportive      Speech:  normal      Thought Process/Content: Logical  Linear      Affective Functioning: Congruent      Mood: euthymic      Level of consciousness:  Alert, Oriented x4, and Attentive      Response to Learning: Able to verbalize current knowledge/experience      Endings: None Reported    Modes of Intervention: Education, Support, Socialization, and Exploration      Discipline Responsible: /Counselor      Signature:  SPARKLE Wick LSW

## 2023-11-04 NOTE — CARE COORDINATION
Pt signed an IRLANDA for his uncle Ravin ([phone]). SW attempted to contact pt's uncle. Pt's uncle did not answer. SW left a voicemail. SW will attempt again at a later time.

## 2023-11-04 NOTE — GROUP NOTE
Group Therapy Note    Date: 11/4/2023  Start Time: 2726  End Time:  1700  Number of Participants: 7    Type of Group: Recreational    Wellness Binder Information  Module Name:  Spoons    Patient's Goal:  to explore potential new leisure interest.  Increase socialization amongst peers    Notes:  SHAINAS facilitated the card game Spoons. Patient actively engaged in game and was able to understand and complete multi-step tasks    Status After Intervention:  Improved    Participation Level:  Active Listener and Interactive    Participation Quality: Appropriate, Attentive, and Sharing      Speech:  normal      Thought Process/Content: Logical      Affective Functioning: Congruent      Mood: euthymic      Level of consciousness:  Alert and Attentive      Response to Learning: Able to verbalize current knowledge/experience, Able to verbalize/acknowledge new learning, and Able to retain information      Endings: None Reported    Modes of Intervention: Activity      Discipline Responsible: Psychoeducational Specialist      Signature:  AMY Livingston     Group Therapy Note    Attendees: 7

## 2023-11-04 NOTE — CARE COORDINATION
JUDAH spoke with pt's uncle Ravin. Ravin states that this patient is able to reside with him at discharge. Ravin states that there will be no access to weapons at his home. Ravin voiced no concerns with this patient being discharged today. JUDAH met with this patient to discuss discharge. Pt states that he has no concerns with being discharged today. Pt is poliet and cooperative with this . Pt appears alert and oriented x 4. Pt is currently denying SI/ HI/ hallucinations/ delusions. Pt will reside with his uncle Ravin at discharge. Pt's uncle Ravin to provide transportation. Pt would like contacted with [facility] appointment on Monday. SW will continue to assist as needed.

## 2023-11-04 NOTE — GROUP NOTE
Group Therapy Note    Date: 11/4/2023  Start Time: 1800  End Time:  7616  Number of Participants: 10    Type of Group: Recreational    Wellness Binder Information  Module Name:  Coffee Filter leaf Art  Patient's Goal:  To improve relaxation. Increase socialization and explore potential new leisure interest    Notes:  CTRS demonstrated coffee filter art to patient. Patient actively engaged in art and wrote down a coping skill to utilize when in crisis on leaf. Patient observed smiling and conversing with peers. Status After Intervention:  Improved    Participation Level:  Active Listener and Interactive    Participation Quality: Appropriate and Attentive      Speech:  normal      Thought Process/Content: Logical      Affective Functioning: Congruent      Mood: euthymic      Level of consciousness:  Alert and Attentive      Response to Learning: Able to verbalize current knowledge/experience and Able to verbalize/acknowledge new learning      Endings: None Reported    Modes of Intervention: Activity      Discipline Responsible: Psychoeducational Specialist      Signature:  AMY Madrigal     Group Therapy Note    Attendees: 10

## 2023-11-05 PROCEDURE — 6370000000 HC RX 637 (ALT 250 FOR IP): Performed by: PSYCHIATRY & NEUROLOGY

## 2023-11-05 PROCEDURE — 99232 SBSQ HOSP IP/OBS MODERATE 35: CPT | Performed by: NURSE PRACTITIONER

## 2023-11-05 PROCEDURE — 1240000000 HC EMOTIONAL WELLNESS R&B

## 2023-11-05 PROCEDURE — 6370000000 HC RX 637 (ALT 250 FOR IP): Performed by: NURSE PRACTITIONER

## 2023-11-05 RX ADMIN — NICOTINE POLACRILEX 2 MG: 2 LOZENGE ORAL at 17:23

## 2023-11-05 RX ADMIN — OLANZAPINE 2.5 MG: 2.5 TABLET, FILM COATED ORAL at 08:53

## 2023-11-05 RX ADMIN — DIVALPROEX SODIUM 250 MG: 250 TABLET, DELAYED RELEASE ORAL at 08:53

## 2023-11-05 RX ADMIN — NICOTINE POLACRILEX 2 MG: 2 LOZENGE ORAL at 08:53

## 2023-11-05 RX ADMIN — NICOTINE POLACRILEX 2 MG: 2 LOZENGE ORAL at 21:41

## 2023-11-05 RX ADMIN — OLANZAPINE 5 MG: 5 TABLET, FILM COATED ORAL at 21:00

## 2023-11-05 RX ADMIN — MELATONIN 3 MG ORAL TABLET 3 MG: 3 TABLET ORAL at 21:00

## 2023-11-05 RX ADMIN — DIVALPROEX SODIUM 500 MG: 500 TABLET, DELAYED RELEASE ORAL at 21:00

## 2023-11-05 RX ADMIN — NICOTINE POLACRILEX 2 MG: 2 LOZENGE ORAL at 14:36

## 2023-11-05 RX ADMIN — HYDROXYZINE PAMOATE 50 MG: 50 CAPSULE ORAL at 11:01

## 2023-11-05 NOTE — GROUP NOTE
Group Therapy Note    Date: 11/5/2023    Group Start Time: 7501  Group End Time: 4155  Group Topic: Psychoeducation    [facility]    Mckenna Treadwell                                                                        Group Therapy Note    Date: 11/5/2023  Start Time: 4858  End Time:  0932  Number of Participants: 15    Type of Group: Psychoeducation    Name: Self-esteem    Patient's Goal: ID what is self-esteem, influences of self-esteem, characteristics of low/high self-esteem and ways self-esteem can be increased/maintained. Notes: Patient was actively engaged in discussion and made positive contributions to discussion. Status After Intervention:  Improved    Participation Level:  Active Listener and Interactive    Participation Quality: Appropriate, Attentive, Sharing, and Supportive    Speech:  normal    Thought Process/Content: Logical  Linear    Affective Functioning: Congruent    Mood:  Appropriate    Level of consciousness:  Alert and Attentive    Response to Learning: Able to verbalize current knowledge/experience, Able to verbalize/acknowledge new learning, Able to retain information, Capable of insight, Able to change behavior, and Progressing to goal    Endings: None Reported    Modes of Intervention: Education    Discipline Responsible: Psychoeducational Specialist      Signature:  Mckenna Treadwell

## 2023-11-06 VITALS
OXYGEN SATURATION: 100 % | SYSTOLIC BLOOD PRESSURE: 118 MMHG | WEIGHT: 130 LBS | TEMPERATURE: 97.2 F | HEART RATE: 55 BPM | RESPIRATION RATE: 16 BRPM | HEIGHT: 68 IN | BODY MASS INDEX: 19.7 KG/M2 | DIASTOLIC BLOOD PRESSURE: 69 MMHG

## 2023-11-06 PROCEDURE — 6370000000 HC RX 637 (ALT 250 FOR IP): Performed by: PSYCHIATRY & NEUROLOGY

## 2023-11-06 PROCEDURE — 99239 HOSP IP/OBS DSCHRG MGMT >30: CPT | Performed by: NURSE PRACTITIONER

## 2023-11-06 PROCEDURE — 6370000000 HC RX 637 (ALT 250 FOR IP): Performed by: NURSE PRACTITIONER

## 2023-11-06 RX ADMIN — DIVALPROEX SODIUM 250 MG: 250 TABLET, DELAYED RELEASE ORAL at 08:35

## 2023-11-06 RX ADMIN — NICOTINE POLACRILEX 2 MG: 2 LOZENGE ORAL at 11:30

## 2023-11-06 RX ADMIN — NICOTINE POLACRILEX 2 MG: 2 LOZENGE ORAL at 13:35

## 2023-11-06 RX ADMIN — NICOTINE POLACRILEX 2 MG: 2 LOZENGE ORAL at 08:37

## 2023-11-06 RX ADMIN — NICOTINE POLACRILEX 2 MG: 2 LOZENGE ORAL at 15:34

## 2023-11-06 RX ADMIN — HYDROXYZINE PAMOATE 50 MG: 50 CAPSULE ORAL at 07:29

## 2023-11-06 RX ADMIN — OLANZAPINE 2.5 MG: 2.5 TABLET, FILM COATED ORAL at 08:35

## 2023-11-06 RX ADMIN — HYDROXYZINE PAMOATE 50 MG: 50 CAPSULE ORAL at 16:06

## 2023-11-06 NOTE — PROGRESS NOTES
240 [facility] Interdisciplinary Treatment Plan Note     Review Date & Time: 11/5/2023 0900    Patient was not in treatment team.    Estimated Length of Stay Update:  3-5 days   Estimated Discharge Date Update: 11/8/2023    EDUCATION:   Learner Progress Toward Treatment Goals: Reviewed group plan and strategies    Method: Individual    Outcome: Verbalized understanding    PATIENT GOALS: medication compliance and group therapy attendance    PLAN/TREATMENT RECOMMENDATIONS UPDATE: medication compliance and group therapy attendance    GOALS UPDATE:  Time frame for Short-Term Goals: 3-5 days      Gian Byrd RN
[facility] FOLLOW-UP NOTE     10/31/2023     Patient was seen and examined in person, Chart reviewed   Patient's case discussed with staff/team    Chief Complaint: Disheveled poor insight and judgment    Interim History: I saw patient today in his room. He is encouraged to attend groups. Per staff he comes out to the milieu in the evening. However he is isolate during the day and does not attend groups or socialize with peers. When I discussed this with patient he states that he does not like being around people and does not want to go to the groups. He indicates that he would like to go to rehab and I explained to patient that rehab involves going to groups. He reported fleeting suicidal ideations to nursing staff this morning denies current suicidal ideations to me during my evaluation he is disheveled his hair has not been combed he is observed talking to himself on the unit I also heard him talking to himself in his room agitated believes that he is being discharged with \"nowhere to go. \"  He is medication compliant        Appetite: [x] Normal/Unchanged  [] Increased  [] Decreased      Sleep:       [x] Normal/Unchanged  [] Fair       [] Poor              Energy:    [x] Normal/Unchanged  [] Increased  [] Decreased        SI [] Present  [x] Absent    HI  []Present  [x] Absent     Aggression:  [] yes  [x] no    Patient is [x] able  [] unable to CONTRACT FOR SAFETY     PAST MEDICAL/PSYCHIATRIC HISTORY:   Past Medical History:   Diagnosis Date    Opioid use disorder        FAMILY/SOCIAL HISTORY:  Family History   Problem Relation Age of Onset    Hypertension Mother     Pancreatic Cancer Father 64     Social History     Socioeconomic History    Marital status: Single     Spouse name: Not on file    Number of children: Not on file    Years of education: Not on file    Highest education level: Not on file   Occupational History     Employer: [employer]   Tobacco Use    Smoking status: Every Day
Attended this denise's 1 h group on lessons fr the life of  daniel cali. Attentive to a/v materials used / presented. Scored 100 on each quiz. Appeared to fully digest key learning points. Was very helpful during set up and take down of tables / chairs etc for group. Ramses Arnold
BEHAVIORAL HEALTH FOLLOW-UP NOTE     11/1/2023     Patient was seen and examined in person, Chart reviewed   Patient's case discussed with staff/team    Chief Complaint: \"I am feeling better. \"    Interim History: Seen in treatment team today. He states that he is feeling better. He washed and combed his hair he is up taking care of his ADLs attending groups and socializing with peers. He denies SI/HI intent or plan denies auditory or visual hallucinations future focused wants to go to rehab and look into getting into a sober living house on discharge. He states that he is sick of being locked up either in penitentiary or in the psychiatric unit he wants to work on being able to stay out of the hospital and out of penitentiary. Appetite: [x] Normal/Unchanged  [] Increased  [] Decreased      Sleep:       [x] Normal/Unchanged  [] Fair       [] Poor              Energy:    [x] Normal/Unchanged  [] Increased  [] Decreased        SI [] Present  [x] Absent    HI  []Present  [x] Absent     Aggression:  [] yes  [x] no    Patient is [x] able  [] unable to CONTRACT FOR SAFETY     PAST MEDICAL/PSYCHIATRIC HISTORY:   Past Medical History:   Diagnosis Date    Opioid use disorder        FAMILY/SOCIAL HISTORY:  Family History   Problem Relation Age of Onset    Hypertension Mother     Pancreatic Cancer Father 64     Social History     Socioeconomic History    Marital status: Single     Spouse name: Not on file    Number of children: Not on file    Years of education: Not on file    Highest education level: Not on file   Occupational History     Employer: [employer]   Tobacco Use    Smoking status: Every Day     Packs/day: 0.50     Years: 10.00     Additional pack years: 0.00     Total pack years: 5.00     Types: Cigarettes     Start date: 9/20/2002    Smokeless tobacco: Never    Tobacco comments:     Try to quit    Vaping Use    Vaping Use: Never used   Substance and Sexual Activity    Alcohol use: Not Currently    Drug use:  Yes
BEHAVIORAL HEALTH FOLLOW-UP NOTE     11/2/2023     Patient was seen and examined in person, Chart reviewed   Patient's case discussed with staff/team    Chief Complaint: \"I am having a bad day I had a bad call for my mom. \"    Interim History: I spoke with patient today in his room. He states that he had \"a bad day\" after a back call with his mom. He states that he has been estranged from his mom for quite some time but finally reached out to her and that she was not receptive to talking to him he states that he has nowhere to go on discharge she states that he is having trouble getting his life together when I talk about rehab with him he states that he is \"sick of being institutionalized. \"  Patient does not seem committed to rehab. He does agree to have  reach out to his mom and agrees to sign an IRLANDA. He denies suicidal ideations intent or plan he admits to making some vague statements to nursing staff today but states \"I would never hurt myself, I was just mad after talking to my mom. \"  He denies any auditory or visual hallucinations he was attending groups yesterday is more isolative and irritable today.       Appetite: [x] Normal/Unchanged  [] Increased  [] Decreased      Sleep:       [x] Normal/Unchanged  [] Fair       [] Poor              Energy:    [x] Normal/Unchanged  [] Increased  [] Decreased        SI [] Present  [x] Absent    HI  []Present  [x] Absent     Aggression:  [] yes  [x] no    Patient is [x] able  [] unable to CONTRACT FOR SAFETY     PAST MEDICAL/PSYCHIATRIC HISTORY:   Past Medical History:   Diagnosis Date    Opioid use disorder        FAMILY/SOCIAL HISTORY:  Family History   Problem Relation Age of Onset    Hypertension Mother     Pancreatic Cancer Father 64     Social History     Socioeconomic History    Marital status: Single     Spouse name: Not on file    Number of children: Not on file    Years of education: Not on file    Highest education level: Not on file
BEHAVIORAL HEALTH FOLLOW-UP NOTE     11/3/2023     Patient was seen and examined in person, Chart reviewed   Patient's case discussed with staff/team    Chief Complaint: \"I not want to go to rehab\"    Interim History: I saw patient this morning up on the unit. He now states that he does not want to go to rehab. He believes that he will be able to stay with his uncle on discharge states that he has his number and he is going to call him. He also asked to talk to. Covered because he states he wants to do outpatient rehab. He continues to report high levels of depression attended 10 staff indicates that he has been exaggerated anxious and labile. Staff reports has been out visible in the milieu socializing with peers.   He denies SI/HI intent or plan denies auditory or visual hallucinations reports the Depakote during the day is not helping him with this and anxiety symptoms          Appetite: [x] Normal/Unchanged  [] Increased  [] Decreased      Sleep:       [x] Normal/Unchanged  [] Fair       [] Poor              Energy:    [x] Normal/Unchanged  [] Increased  [] Decreased        SI [] Present  [x] Absent    HI  []Present  [x] Absent     Aggression:  [] yes  [x] no    Patient is [x] able  [] unable to CONTRACT FOR SAFETY     PAST MEDICAL/PSYCHIATRIC HISTORY:   Past Medical History:   Diagnosis Date    Opioid use disorder        FAMILY/SOCIAL HISTORY:  Family History   Problem Relation Age of Onset    Hypertension Mother     Pancreatic Cancer Father 64     Social History     Socioeconomic History    Marital status: Single     Spouse name: Not on file    Number of children: Not on file    Years of education: Not on file    Highest education level: Not on file   Occupational History     Employer: [employer]   Tobacco Use    Smoking status: Every Day     Packs/day: 0.50     Years: 10.00     Additional pack years: 0.00     Total pack years: 5.00     Types: Cigarettes     Start date: 9/20/2002    Smokeless tobacco:
BEHAVIORAL HEALTH FOLLOW-UP NOTE     11/4/2023     Patient was seen and examined in person, Chart reviewed   Patient's case discussed with staff/team    Chief Complaint: \"I talk to my uncle I can go stay with him, I want to do outpatient treatment\"    Interim History: I saw patient on the unit this morning for a pleasant affect. He states that he talked to his uncle and his uncle let him stay with him. He states that he talked to recovery and he wants to do outpatient treatment he is aware that he needs to focus on his sobriety and knows that he cannot burn the bridge with his uncle. He is hopeful for the future believes this will help him get his life back together.   He denies suicidal ideations intent or plan denies any auditory or visual hallucinations eating well sleeping well and no neurovegetative signs of depression        Appetite: [x] Normal/Unchanged  [] Increased  [] Decreased      Sleep:       [x] Normal/Unchanged  [] Fair       [] Poor              Energy:    [x] Normal/Unchanged  [] Increased  [] Decreased        SI [] Present  [x] Absent    HI  []Present  [x] Absent     Aggression:  [] yes  [x] no    Patient is [x] able  [] unable to CONTRACT FOR SAFETY     PAST MEDICAL/PSYCHIATRIC HISTORY:   Past Medical History:   Diagnosis Date    Opioid use disorder        FAMILY/SOCIAL HISTORY:  Family History   Problem Relation Age of Onset    Hypertension Mother     Pancreatic Cancer Father 64     Social History     Socioeconomic History    Marital status: Single     Spouse name: Not on file    Number of children: Not on file    Years of education: Not on file    Highest education level: Not on file   Occupational History     Employer: [employer]   Tobacco Use    Smoking status: Every Day     Packs/day: 0.50     Years: 10.00     Additional pack years: 0.00     Total pack years: 5.00     Types: Cigarettes     Start date: 9/20/2002    Smokeless tobacco: Never    Tobacco comments:     Try to quit    Vaping
BEHAVIORAL HEALTH FOLLOW-UP NOTE     11/5/2023     Patient was seen and examined in person, Chart reviewed   Patient's case discussed with staff/team    Chief Complaint: \"My uncle says he wants me to come there\"    Interim History: Patient seen this morning in his room. He is pleasant and cooperative he denies suicidal ideations intent or plan denies any auditory or visual hallucinations he is eating well sleeping well no neurovegetative signs of depression no overt or covert signs of psychosis. He states he talked to his uncle's uncle and wants him to live there. He has had no further behavioral disturbances on the unit has been in control of his behaviors.       Appetite: [x] Normal/Unchanged  [] Increased  [] Decreased      Sleep:       [x] Normal/Unchanged  [] Fair       [] Poor              Energy:    [x] Normal/Unchanged  [] Increased  [] Decreased        SI [] Present  [x] Absent    HI  []Present  [x] Absent     Aggression:  [] yes  [x] no    Patient is [x] able  [] unable to CONTRACT FOR SAFETY     PAST MEDICAL/PSYCHIATRIC HISTORY:   Past Medical History:   Diagnosis Date    Opioid use disorder        FAMILY/SOCIAL HISTORY:  Family History   Problem Relation Age of Onset    Hypertension Mother     Pancreatic Cancer Father 64     Social History     Socioeconomic History    Marital status: Single     Spouse name: Not on file    Number of children: Not on file    Years of education: Not on file    Highest education level: Not on file   Occupational History     Employer: [employer]   Tobacco Use    Smoking status: Every Day     Packs/day: 0.50     Years: 10.00     Additional pack years: 0.00     Total pack years: 5.00     Types: Cigarettes     Start date: 9/20/2002    Smokeless tobacco: Never    Tobacco comments:     Try to quit    Vaping Use    Vaping Use: Never used   Substance and Sexual Activity    Alcohol use: Not Currently    Drug use: Yes     Types: Opiates      Comment: opioid addiction clean about 2
CLINICAL PHARMACY NOTE: MEDS TO BEDS    Total # of Prescriptions Filled: 4   The following medications were delivered to the patient:  Divalproex 500 mg  Divalproex 250 mg  Olanzapine 2.5 mg  Olanzapine 5 mg    Additional Documentation:  Picked up by GRACE in pharmacy
Isolative to room this evening denies any SI HI or kramer has goals to get into sober living house emotional support given
Leisure assessment completed.
PT. TO NURSES STATION WITH A LIST OF SYMPTOMS AS FOLLOWS: BEING HAPPY, BUT DOWN, MIND RACING, MAKING VERY BAD CHOICES, HIGH/LOW EMOTIONS, AGITATION, SMALLEST THINGS MAKE ME MAD, LACK OF MOTIVATION, IRRATIONAL CHOICES.
PT. WAS RESISTIVE AND MOOD IRRITABLE DURING INITIATION OF 2 HR. LOCK OUT, \"YOU CAN JUST SEND ME TO THE [facility]. I DON'T WANT TO GO TO REHAB!\".
Patient attended community meeting   Was updated on expectations of the unit, staffing, and programming  Patient shared goal for today as Juaquin Buckner out of my room\"
Patient attended community meeting. Was updated on expectations of the unit, staffing, and programming. Patient shared goal for today as \"Figure out half my life today. \"  Patient was 1 of 15 in attendance.
Patient attended morning community meeting. Updated on staffing assignments and daily expectations. Shared goal for the day as to be hopeful.
Patient attended morning community meeting. Updated on staffing assignments and daily expectations. Shared goal for the day as to be ready.
Patient attended morning community meeting. Updated on staffing assignments and daily expectations. Shared goal for the day as to not be sarcastic.
Patient declined invitation to the following groups    [facility]- patient was provided with a handout on today's group topic- Five ways to well being    Patient will continue to be provided with opportunities to enhance leisure skills/interests and/or coping mechanisms.
Patient declined invitation to the following groups:    [employer]    Patient will continue to be provided with opportunities to enhance leisure skills/interests and/or coping mechanisms.
Patient declined to attend the following groups:    [employer]  Psychoeducation       Will continue to encourage patient to attend programming.
Patient denies suicidal ideation, denies hallucinations, denies homicidal ideation. Patient is compliant with medications, states \"I am feeling kind of better\". Patient agrees to attend group therapy today. \"I need to do better with that\". Patient up on unit, eating breakfast, signed Voluntary consent for treatment. Patient appears guarded and preoccupied upon approach, warms up with conversation. Behavior in control.
Patient denies suicidal ideation, homicidal ideations and AVH. Patient states anxiety and depression is \"through the roof\" rating both 10/10 due to \"just in general.\"  Patient states \"I'm alright\" initially but then states \"night time get depressed, always like that. Need something during the day, Depakote not doing anything for me. \"  Patient appears elevated, exaggerated anxious and labile with this nurse but friendly and social with peers. Affect incongruent. Presents calm and cooperative during assessment. Patient is out on the unit and is very social with peers. Medications taken without issue. No complaints or concerns verbalized at this time. No unit problems reported. Will continue to observe and support.
Patient is compliant with medications, attends group therapy. Patient denies any thoughts to harm self or others, denies hallucinations. Patient is social with peers and staff. Patient appetite is good, behavior in control.
Patient is resting quietly in bed with eyes closed at this time. No signs of distress or discomfort noted. No PRN medications given thus far. Safety needs met. No unit problems reported. Purposeful rounding continued.
Patient is resting quietly in bed with eyes closed at this time. No signs of distress or discomfort noted. No PRN medications given thus far. Safety needs met. No unit problems reported. Purposeful rounding continued.
Patient voice became loud and pressured when interacting with another patient \"that girl dates my Josef Munoz, he owns a bunch of houses and she is so insecure and thinks she has to get discharged first or he will forget to come get her\". Patient states his plan is to live with them after discharge. Patient states Josef Munoz visited the unit yesterday and they planned this for his discharge plan. Patient is anxious but in control. Patient was given PRN medication for anxiety with good results. Patient is now calm and cooperative. Patient denies any hallucinations, denies homicidal ideation, denies suicidal thoughts. Patient is social with peers and staff. Patient attends group therapy.
Rested fair this shift no distress
Spiritual Support Group Note    Number of Participants in Group:    13                    Time: 2    Goal: Relief from isolation and loneliness             Gali Sharing             Self-understanding and gain insight              Acceptance and belonging            Recognize they are not alone                Socialization             Empowerment       Encouragement    Topic:  [x] Spiritual Wellness and Self Care                  [] Hope                     [] Connecting with Divine/Others        [] Thankfulness and Gratitude               [x]  Meaningfulness and Purpose               [] Forgiveness               [] Peace               [x] Connect to [facility]      [] Other    Participation Level:   [x] Active Listener   [] Minimal   [] Monopolizing   [] Interactive   [] No Participation   []  Other:     Attention:   [x] Alert   [] Distractible   [] Drowsy   [] Poor   [] Other:    Manner:   [x] Cooperative   [] Suspicious   [] Withdrawn   [] Guarded   [] Irritable   [] Inhospitable   [] Other:     Others Comments from Group:
[facility] 10/28/2023 08:13 PM     No results found for: \"TSH\", \"FREET4\"  No results found for: \"LITHIUM\"  No results found for: \"VALPROATE\", \"CBMZ\"        Treatment Plan:  Reviewed current Medications with the patient. Risks, benefits, side effects, drug-to-drug interactions and alternatives to treatment were discussed. Collateral information:   CD evaluation  Encourage patient to attend group and other milieu activities.   Discharge planning discussed with the patient and treatment team.    Continue Depakote 250 mg milligrams twice daily  Zyprexa 5 mg at bedtime    PSYCHOTHERAPY/COUNSELING:  [x] Therapeutic interview  [x] Supportive  [] CBT  [] Ongoing  [] Other    [x] Patient continues to need, on a daily basis, active treatment furnished directly by or requiring the supervision of inpatient psychiatric personnel      Anticipated Length of stay: 3 to 7 days based on stability            Electronically signed by ANNALEE Perkins CNP on 50/07/7099 at 1:05 PM

## 2023-11-06 NOTE — GROUP NOTE
Group Therapy Note    Date: 11/6/2023    Group Start Time: 4550  Group End Time: 7377  Group Topic: Psychoeducation    [facility] AC[facility]  1    [facility]                                                                        Group Therapy Note    Date: 11/6/2023  Module Name:  processing big changes   Patient's Goal:  patient will be able to id key steps to be successful with change. Notes:  Pleasant and sharing in group, able to id key changes to make to manager stressors   when feeling overwhelmed with change. Status After Intervention:  Improved    Participation Level:  Active Listener and Interactive    Participation Quality: Appropriate and Attentive      Speech:  normal      Thought Process/Content: Logical      Affective Functioning: Congruent      Mood: euthymic      Level of consciousness:  Alert, Oriented x4, and Attentive      Response to Learning: Able to verbalize/acknowledge new learning, Able to retain information, and Progressing to goal      Endings: None Reported    Modes of Intervention: Education, Support, Socialization, and Problem-solving      Discipline Responsible: Psychoeducational Specialist      Signature:  Radha Aguirre

## 2023-11-06 NOTE — CARE COORDINATION
JUDAH met with pt to discuss discharge. Pt found in common area, is calm and appropriate. He states that he plans to go to his uncle's house today, bus to transport. PT denied SI/HI/AVH. JUDAH informed him of his upcoming appointment with [facility] and he is grateful he did not miss this. He states that he intends to follow up with his  after discharge to ensure he is not in any legal trouble. Judah provided hospital excuse for pt. JUDAH contacted pt uncle Ravin ([phone]) (IRLANDA signed) to discuss pt discharge. He states that he has no concerns for pt discharge today and states that pt is still able to come to his house. He states that he has no issue with pt utilizing the bus today. JUDAH contacted pt mother Nate Sessions  (IRLANDA signed) to inform her of pt discharge. She states no concerns for pt discharge to his uncle's house today.      In order to ensure appropriate transition and discharge planning is in place, the following documents have been transmitted to [place], as the new outpatient provider:    The d/c diagnosis was transmitted to the next care provider  The reason for hospitalization was transmitted to the next care provider  The d/c medications (dosage and indication) were transmitted to the next care provider   The continuing care plan was transmitted to the next care provider

## 2023-11-06 NOTE — GROUP NOTE
Group Therapy Note    Date: 11/6/2023    Group Start Time: 1100  Group End Time: 1150  Group Topic: Psychotherapy    [facility] 1    Minerva Mendez MSW, ROSENDO        Group Therapy Note    Attendees: 8       Patient's Goal:  To increase social interaction and improve relationships with others. Notes:  Pt was attentive in group and was able to identify an agenda. They were also able to verbalize relating to others within the group. Status After Intervention:  Improved    Participation Level: Active Listener and Interactive    Participation Quality: Appropriate, Attentive, Sharing, and Supportive      Speech:  normal      Thought Process/Content: Logical      Affective Functioning: Congruent      Mood: euthymic      Level of consciousness:  Alert, Oriented x4, and Attentive      Response to Learning: Able to verbalize current knowledge/experience, Able to verbalize/acknowledge new learning, and Able to retain information      Endings: None Reported    Modes of Intervention: Education, Support, Socialization, Exploration, Clarifying, and Problem-solving      Discipline Responsible: /Counselor      Signature:   SPARKLE Cuenca LSW

## 2023-11-06 NOTE — PLAN OF CARE
[facility]  Initial Interdisciplinary Treatment Plan NOTE    Review Date & Time: 10/29/2023  1:34 PM     Patient was in treatment team    Admission Type: involuntary     Reason for admission:erratic behaviors at home and no sleep in last few days         Estimated Length of Stay Update:  5 days  Estimated Discharge Date Update:  11/3/23    EDUCATION:   Learner Progress Toward Treatment Goals: Reviewed results and recommendations of this team, Reviewed group plan and strategies, Reviewed signs, symptoms and risk of self harm and violent behavior, and Reviewed goals and plan of care    Method: Group    Outcome: Needs reinforcement    PATIENT GOALS: refused    PLAN/TREATMENT RECOMMENDATIONS UPDATE: encourage daily goals and groups    GOALS UPDATE:   Time frame for Short-Term Goals:  by discharge    Radha Crenshaw RN
[facility]  Day 3 Interdisciplinary Treatment Plan NOTE    Review Date & Time:  10/30/23 1000    Patient was not in treatment team. PT. DECLINED. Estimated Length of Stay Update:   5 DAYS  Estimated Discharge Date Update:  WED.     EDUCATION:   Learner Progress Toward Treatment Goals: Reviewed results and recommendations of this team    Method: Individual    Outcome: Needs reinforcement    PATIENT GOALS: NONE REPORTED    PLAN/TREATMENT RECOMMENDATIONS UPDATE: CONTINUE MEDICATIONS, SUPPORTIVE CARE, REFERRAL TO OUTPATIENT CARE, DISCHARGE FOLLOW UP, COLLATERAL INFORMATION, MEDICATIONS, SUICIDE PRECAUTIONS    GOALS UPDATE:   Time frame for Short-Term Goals:  5 DAYS      Chas Navarro RN
Problem: Anxiety  Goal: Will report anxiety at manageable levels  Description: INTERVENTIONS:  1. Administer medication as ordered  2. Teach and rehearse alternative coping skills  3. Provide emotional support with 1:1 interaction with staff  10/31/2023 1052 by Esha Bellamy RN  Outcome: Not Progressing  10/30/2023 [id number] by Lefty Bright RN  Outcome: Progressing     Problem: Coping  Goal: Pt/Family able to verbalize concerns and demonstrate effective coping strategies  Description: INTERVENTIONS:  1. Assist patient/family to identify coping skills, available support systems and cultural and spiritual values  2. Provide emotional support, including active listening and acknowledgement of concerns of patient and caregivers  3. Reduce environmental stimuli, as able  4. Instruct patient/family in relaxation techniques, as appropriate  5. Assess for spiritual pain/suffering and initiate Spiritual Care, Psychosocial Clinical Specialist consults as needed  Outcome: Not Progressing     Problem: Behavior  Goal: Pt/Family maintain appropriate behavior and adhere to behavioral management agreement, if implemented  Description: INTERVENTIONS:  1. Assess patient/family's coping skills and  non-compliant behavior (including use of illegal substances)  2. Notify security of behavior or suspected illegal substances which indicate the need for search of the family and/or belongings  3. Encourage verbalization of thoughts and concerns in a socially appropriate manner  4. Utilize positive, consistent limit setting strategies supporting safety of patient, staff and others  5. Encourage participation in the decision making process about the behavioral management agreement  6. If a visitor's behavior poses a threat to safety call refer to organization policy.   7. Initiate consult with , Psychosocial CNS, Spiritual Care as appropriate  Outcome: Not Progressing     Problem: Involuntary Admit  Goal: Will cooperate with
Problem: Anxiety  Goal: Will report anxiety at manageable levels  Description: INTERVENTIONS:  1. Administer medication as ordered  2. Teach and rehearse alternative coping skills  3. Provide emotional support with 1:1 interaction with staff  11/2/2023 2156 by Amyaa Baumann RN  Outcome: Progressing  11/2/2023 1648 by Stefania Carrizales RN  Outcome: Progressing     Problem: Coping  Goal: Pt/Family able to verbalize concerns and demonstrate effective coping strategies  Description: INTERVENTIONS:  1. Assist patient/family to identify coping skills, available support systems and cultural and spiritual values  2. Provide emotional support, including active listening and acknowledgement of concerns of patient and caregivers  3. Reduce environmental stimuli, as able  4. Instruct patient/family in relaxation techniques, as appropriate  5. Assess for spiritual pain/suffering and initiate Spiritual Care, Psychosocial Clinical Specialist consults as needed  Outcome: Progressing     Problem: Behavior  Goal: Pt/Family maintain appropriate behavior and adhere to behavioral management agreement, if implemented  Description: INTERVENTIONS:  1. Assess patient/family's coping skills and  non-compliant behavior (including use of illegal substances)  2. Notify security of behavior or suspected illegal substances which indicate the need for search of the family and/or belongings  3. Encourage verbalization of thoughts and concerns in a socially appropriate manner  4. Utilize positive, consistent limit setting strategies supporting safety of patient, staff and others  5. Encourage participation in the decision making process about the behavioral management agreement  6. If a visitor's behavior poses a threat to safety call refer to organization policy.   7. Initiate consult with , Psychosocial CNS, Spiritual Care as appropriate  Outcome: Progressing     Problem: Depression/Self Harm  Goal: Effect of psychiatric condition
Problem: Anxiety  Goal: Will report anxiety at manageable levels  Description: INTERVENTIONS:  1. Administer medication as ordered  2. Teach and rehearse alternative coping skills  3. Provide emotional support with 1:1 interaction with staff  Outcome: Progressing     Problem: Behavior  Goal: Pt/Family maintain appropriate behavior and adhere to behavioral management agreement, if implemented  Description: INTERVENTIONS:  1. Assess patient/family's coping skills and  non-compliant behavior (including use of illegal substances)  2. Notify security of behavior or suspected illegal substances which indicate the need for search of the family and/or belongings  3. Encourage verbalization of thoughts and concerns in a socially appropriate manner  4. Utilize positive, consistent limit setting strategies supporting safety of patient, staff and others  5. Encourage participation in the decision making process about the behavioral management agreement  6. If a visitor's behavior poses a threat to safety call refer to organization policy. 7. Initiate consult with , Psychosocial CNS, Spiritual Care as appropriate  Outcome: Progressing     Problem: Depression/Self Harm  Goal: Effect of psychiatric condition will be minimized and patient will be protected from self harm  Description: INTERVENTIONS:  1. Assess impact of patient's symptoms on level of functioning, self care needs and offer support as indicated  2. Assess patient/family knowledge of depression, impact on illness and need for teaching  3. Provide emotional support, presence and reassurance  4. Assess for possible suicidal thoughts or ideation. If patient expresses suicidal thoughts or statements do not leave alone, initiate Suicide Precautions, move to a room close to the nursing station and obtain sitter  5.  Initiate consults as appropriate with Mental Health Professional, Spiritual Care, Psychosocial CNS, and consider a recommendation to the LIP for
Problem: Anxiety  Goal: Will report anxiety at manageable levels  Description: INTERVENTIONS:  1. Administer medication as ordered  2. Teach and rehearse alternative coping skills  3. Provide emotional support with 1:1 interaction with staff  Outcome: Progressing     Problem: Coping  Goal: Pt/Family able to verbalize concerns and demonstrate effective coping strategies  Description: INTERVENTIONS:  1. Assist patient/family to identify coping skills, available support systems and cultural and spiritual values  2. Provide emotional support, including active listening and acknowledgement of concerns of patient and caregivers  3. Reduce environmental stimuli, as able  4. Instruct patient/family in relaxation techniques, as appropriate  5. Assess for spiritual pain/suffering and initiate Spiritual Care, Psychosocial Clinical Specialist consults as needed  Outcome: Progressing     Problem: Behavior  Goal: Pt/Family maintain appropriate behavior and adhere to behavioral management agreement, if implemented  Description: INTERVENTIONS:  1. Assess patient/family's coping skills and  non-compliant behavior (including use of illegal substances)  2. Notify security of behavior or suspected illegal substances which indicate the need for search of the family and/or belongings  3. Encourage verbalization of thoughts and concerns in a socially appropriate manner  4. Utilize positive, consistent limit setting strategies supporting safety of patient, staff and others  5. Encourage participation in the decision making process about the behavioral management agreement  6. If a visitor's behavior poses a threat to safety call refer to organization policy.   7. Initiate consult with , Psychosocial CNS, Spiritual Care as appropriate  Outcome: Progressing
Problem: Anxiety  Goal: Will report anxiety at manageable levels  Description: INTERVENTIONS:  1. Administer medication as ordered  2. Teach and rehearse alternative coping skills  3. Provide emotional support with 1:1 interaction with staff  Outcome: Progressing     Problem: Depression/Self Harm  Goal: Effect of psychiatric condition will be minimized and patient will be protected from self harm  Description: INTERVENTIONS:  1. Assess impact of patient's symptoms on level of functioning, self care needs and offer support as indicated  2. Assess patient/family knowledge of depression, impact on illness and need for teaching  3. Provide emotional support, presence and reassurance  4. Assess for possible suicidal thoughts or ideation. If patient expresses suicidal thoughts or statements do not leave alone, initiate Suicide Precautions, move to a room close to the nursing station and obtain sitter  5. Initiate consults as appropriate with Mental Health Professional, Spiritual Care, Psychosocial CNS, and consider a recommendation to the LIP for a Psychiatric Consultation  Outcome: Not Progressing        Patient has been low profile today. Guarded with conversation. Up for meals and remains withdrawn to room. Denies suicidal and homicidal thoughts. Denies hallucinations.
Problem: Anxiety  Goal: Will report anxiety at manageable levels  Description: INTERVENTIONS:  1. Administer medication as ordered  2. Teach and rehearse alternative coping skills  3. Provide emotional support with 1:1 interaction with staff  Outcome: Progressing     Problem: Depression/Self Harm  Goal: Effect of psychiatric condition will be minimized and patient will be protected from self harm  Description: INTERVENTIONS:  1. Assess impact of patient's symptoms on level of functioning, self care needs and offer support as indicated  2. Assess patient/family knowledge of depression, impact on illness and need for teaching  3. Provide emotional support, presence and reassurance  4. Assess for possible suicidal thoughts or ideation. If patient expresses suicidal thoughts or statements do not leave alone, initiate Suicide Precautions, move to a room close to the nursing station and obtain sitter  5. Initiate consults as appropriate with Mental Health Professional, Spiritual Care, Psychosocial CNS, and consider a recommendation to the LIP for a Psychiatric Consultation  Outcome: Progressing      Had an argument over the phone than rambled on about \"death wish, just shoot me hen I leave, I don't want to die\". Than later came up and said he was upset and didn't mean to say those things. Denies any kind of suicidal thoughts. Denies hallucinations and homicidal thoughts.
Problem: Coping  Goal: Pt/Family able to verbalize concerns and demonstrate effective coping strategies  Description: INTERVENTIONS:  1. Assist patient/family to identify coping skills, available support systems and cultural and spiritual values  2. Provide emotional support, including active listening and acknowledgement of concerns of patient and caregivers  3. Reduce environmental stimuli, as able  4. Instruct patient/family in relaxation techniques, as appropriate  5. Assess for spiritual pain/suffering and initiate Spiritual Care, Psychosocial Clinical Specialist consults as needed  10/30/2023 1336 by Peter Martins RN  Outcome: Not Progressing  10/30/2023 0515 by Fatou Casiano RN  Outcome: Progressing     Problem: Pain  Goal: Verbalizes/displays adequate comfort level or baseline comfort level  10/30/2023 1336 by Peter Martins RN  Outcome: Progressing  10/30/2023 0515 by Fatou Casiano RN  Outcome: Progressing     Problem: Anxiety  Goal: Will report anxiety at manageable levels  Description: INTERVENTIONS:  1. Administer medication as ordered  2. Teach and rehearse alternative coping skills  3. Provide emotional support with 1:1 interaction with staff  10/30/2023 1336 by Peter Martins RN  Outcome: Progressing  10/30/2023 0515 by Fatou Casiano RN  Outcome: Progressing     Problem: Behavior  Goal: Pt/Family maintain appropriate behavior and adhere to behavioral management agreement, if implemented  Description: INTERVENTIONS:  1. Assess patient/family's coping skills and  non-compliant behavior (including use of illegal substances)  2. Notify security of behavior or suspected illegal substances which indicate the need for search of the family and/or belongings  3. Encourage verbalization of thoughts and concerns in a socially appropriate manner  4. Utilize positive, consistent limit setting strategies supporting safety of patient, staff and others  5.  Encourage participation in the decision making process
Problem: Pain  Goal: Verbalizes/displays adequate comfort level or baseline comfort level  10/30/2023 2258 by Kathy Mathew RN  Outcome: Progressing  10/30/2023 1336 by Edward Dixon RN  Outcome: Progressing     Problem: Anxiety  Goal: Will report anxiety at manageable levels  Description: INTERVENTIONS:  1. Administer medication as ordered  2. Teach and rehearse alternative coping skills  3. Provide emotional support with 1:1 interaction with staff  10/30/2023 2258 by Kathy Mathew RN  Outcome: Progressing  10/30/2023 1336 by Edward Dixon RN  Outcome: Progressing     Problem: Coping  Goal: Pt/Family able to verbalize concerns and demonstrate effective coping strategies  Description: INTERVENTIONS:  1. Assist patient/family to identify coping skills, available support systems and cultural and spiritual values  2. Provide emotional support, including active listening and acknowledgement of concerns of patient and caregivers  3. Reduce environmental stimuli, as able  4. Instruct patient/family in relaxation techniques, as appropriate  5. Assess for spiritual pain/suffering and initiate Spiritual Care, Psychosocial Clinical Specialist consults as needed  10/30/2023 1336 by Edward Dixon RN  Outcome: Not Progressing     Problem: Depression/Self Harm  Goal: Effect of psychiatric condition will be minimized and patient will be protected from self harm  Description: INTERVENTIONS:  1. Assess impact of patient's symptoms on level of functioning, self care needs and offer support as indicated  2. Assess patient/family knowledge of depression, impact on illness and need for teaching  3. Provide emotional support, presence and reassurance  4. Assess for possible suicidal thoughts or ideation. If patient expresses suicidal thoughts or statements do not leave alone, initiate Suicide Precautions, move to a room close to the nursing station and obtain sitter  5.  Initiate consults as appropriate with Mental Health
Problem: Pain  Goal: Verbalizes/displays adequate comfort level or baseline comfort level  11/1/2023 0825 by Jonas Randhawa RN  Outcome: Progressing  10/31/2023 1830 by Bry Mckeon RN  Outcome: Adequate for Discharge     Problem: Anxiety  Goal: Will report anxiety at manageable levels  Description: INTERVENTIONS:  1. Administer medication as ordered  2. Teach and rehearse alternative coping skills  3. Provide emotional support with 1:1 interaction with staff  11/1/2023 0825 by Jonas Randhawa RN  Outcome: Progressing  10/31/2023 1830 by Bry Mckeon RN  Outcome: Progressing     Problem: Coping  Goal: Pt/Family able to verbalize concerns and demonstrate effective coping strategies  Description: INTERVENTIONS:  1. Assist patient/family to identify coping skills, available support systems and cultural and spiritual values  2. Provide emotional support, including active listening and acknowledgement of concerns of patient and caregivers  3. Reduce environmental stimuli, as able  4. Instruct patient/family in relaxation techniques, as appropriate  5. Assess for spiritual pain/suffering and initiate Spiritual Care, Psychosocial Clinical Specialist consults as needed  11/1/2023 0825 by Jonas Randhawa RN  Outcome: Progressing  10/31/2023 1830 by Bry Mckeon RN  Outcome: Progressing     Problem: Behavior  Goal: Pt/Family maintain appropriate behavior and adhere to behavioral management agreement, if implemented  Description: INTERVENTIONS:  1. Assess patient/family's coping skills and  non-compliant behavior (including use of illegal substances)  2. Notify security of behavior or suspected illegal substances which indicate the need for search of the family and/or belongings  3. Encourage verbalization of thoughts and concerns in a socially appropriate manner  4. Utilize positive, consistent limit setting strategies supporting safety of patient, staff and others  5.  Encourage participation in
Pt out in the common area, socializing with peers and watching TV. Pt denies SI, HI and AVH. Pt in and out of room through shift, until later in the evening he went to his room to rest with his eyes closed. Problem: Anxiety  Goal: Will report anxiety at manageable levels  Description: INTERVENTIONS:  1. Administer medication as ordered  2. Teach and rehearse alternative coping skills  3. Provide emotional support with 1:1 interaction with staff  Outcome: Progressing     Problem: Coping  Goal: Pt/Family able to verbalize concerns and demonstrate effective coping strategies  Description: INTERVENTIONS:  1. Assist patient/family to identify coping skills, available support systems and cultural and spiritual values  2. Provide emotional support, including active listening and acknowledgement of concerns of patient and caregivers  3. Reduce environmental stimuli, as able  4. Instruct patient/family in relaxation techniques, as appropriate  5. Assess for spiritual pain/suffering and initiate Spiritual Care, Psychosocial Clinical Specialist consults as needed  Outcome: Progressing     Problem: Behavior  Goal: Pt/Family maintain appropriate behavior and adhere to behavioral management agreement, if implemented  Description: INTERVENTIONS:  1. Assess patient/family's coping skills and  non-compliant behavior (including use of illegal substances)  2. Notify security of behavior or suspected illegal substances which indicate the need for search of the family and/or belongings  3. Encourage verbalization of thoughts and concerns in a socially appropriate manner  4. Utilize positive, consistent limit setting strategies supporting safety of patient, staff and others  5. Encourage participation in the decision making process about the behavioral management agreement  6. If a visitor's behavior poses a threat to safety call refer to organization policy.   7. Initiate consult with , Psychosocial CNS, Spiritual Care
Pt resting in bed apparently asleep with easy even respirations at HS q 15 min electronic rounding.
as appropriate  10/31/2023 1830 by Shelly Joe, RN  Outcome: Progressing  10/31/2023 1052 by Garland Chairez, RN  Outcome: Not Progressing     Pt positive for fleeting SI with no plan. Pt denies HI and AVH. Pt stated his depression is very bad \"At least a 10. My anxiety is a 6 or 7. \" Pt stated his depressed feeds into his suicidal thoughts. \"I'm just tired of dealing with my depression all the time. \" Medication compliant. Attends groups. Out on the unit d/t the lockout. Pt has underlying irritability. Brightens with conversation. Showered. Will continue to monitor.
psychiatric condition will be minimized and patient will be protected from self harm  Description: INTERVENTIONS:  1. Assess impact of patient's symptoms on level of functioning, self care needs and offer support as indicated  2. Assess patient/family knowledge of depression, impact on illness and need for teaching  3. Provide emotional support, presence and reassurance  4. Assess for possible suicidal thoughts or ideation. If patient expresses suicidal thoughts or statements do not leave alone, initiate Suicide Precautions, move to a room close to the nursing station and obtain sitter  5. Initiate consults as appropriate with Mental Health Professional, Spiritual Care, Psychosocial CNS, and consider a recommendation to the LIP for a Psychiatric Consultation  Outcome: Progressing     Problem: Drug Abuse/Detox  Goal: Will have no detox symptoms and will verbalize plan for changing drug-related behavior  Description: INTERVENTIONS:  1. Administer medication as ordered  2. Monitor physical status  3. Provide emotional support with 1:1 interaction with staff  4. Encourage  recovery focused treatment   Outcome: Progressing     Problem: Involuntary Admit  Goal: Will cooperate with staff recommendations and doctor's orders and will demonstrate appropriate behavior  Description: INTERVENTIONS:  1. Treat underlying conditions and offer medication as ordered  2. Educate regarding involuntary admission procedures and rules  3.  Contain excessive/inappropriate behavior per unit and hospital policies  Outcome: Progressing
to safety call refer to organization policy. 7. Initiate consult with , Psychosocial CNS, Spiritual Care as appropriate  11/5/2023 0826 by Samantha Simmons RN  Outcome: Progressing  11/5/2023 0112 by Barrington Lopez RN  Outcome: Progressing     Problem: Depression/Self Harm  Goal: Effect of psychiatric condition will be minimized and patient will be protected from self harm  Description: INTERVENTIONS:  1. Assess impact of patient's symptoms on level of functioning, self care needs and offer support as indicated  2. Assess patient/family knowledge of depression, impact on illness and need for teaching  3. Provide emotional support, presence and reassurance  4. Assess for possible suicidal thoughts or ideation. If patient expresses suicidal thoughts or statements do not leave alone, initiate Suicide Precautions, move to a room close to the nursing station and obtain sitter  5. Initiate consults as appropriate with Mental Health Professional, Spiritual Care, Psychosocial CNS, and consider a recommendation to the LIP for a Psychiatric Consultation  Outcome: Progressing     Problem: Drug Abuse/Detox  Goal: Will have no detox symptoms and will verbalize plan for changing drug-related behavior  Description: INTERVENTIONS:  1. Administer medication as ordered  2. Monitor physical status  3. Provide emotional support with 1:1 interaction with staff  4. Encourage  recovery focused treatment   Outcome: Progressing     Problem: Involuntary Admit  Goal: Will cooperate with staff recommendations and doctor's orders and will demonstrate appropriate behavior  Description: INTERVENTIONS:  1. Treat underlying conditions and offer medication as ordered  2. Educate regarding involuntary admission procedures and rules  3.  Contain excessive/inappropriate behavior per unit and hospital policies  Outcome: Progressing

## 2023-11-06 NOTE — CARE COORDINATION
Pt approached SW and asked if SW contacted his . SW stated that SW did not do this as pt did not request this. Pt verbalized that he believes he was set up with the hospitals program at [facility] and asked SW to contact his  to discuss this. Pt states that he is unsure of his 's name, but states that she is through [facility]. Verbal permission granted to speak with pt  witnessed by this SW and [address] [place] [address]. SW contacted [facility] [phone] and was informed that pt  name is Radha Carter. JUDAH spoke with Radha Carter who states that pt is scheduled to go to [facility] on the 11/8 at 1pm. She states that pt is able to stay at the [facility] if needed at discharge. SW contacted [facility] [phone] to confirm pt appointment. Pt does have appointment 11/8 at 1pm for assessment. They state that they will set up pt with mental health medication management services at this appointment.

## 2023-11-06 NOTE — BH NOTE
[facility] Interdisciplinary Treatment Plan Note     Review Date & Time: 11/6/23 1040    Patient was not in treatment team.    Estimated Length of Stay Update:  d/c today   Estimated Discharge Date Update: d/c today    EDUCATION:   Learner Progress Toward Treatment Goals: Reviewed results and recommendations of this team    Method: Small group    Outcome: Verbalized understanding    PATIENT GOALS: discharge today    PLAN/TREATMENT RECOMMENDATIONS UPDATE: discharge today    GOALS UPDATE:  Time frame for Short-Term Goals: discharge today      Mehul Wood RN
4 Eyes Skin Assessment     NAME:  Chantal Felder  YOB: 1990  MEDICAL RECORD NUMBER:  [id number]    The patient is being assessed for  Admission    I agree that at least one RN has performed a thorough Head to Toe Skin Assessment on the patient. ALL assessment sites listed below have been assessed. Areas assessed by both nurses:    Head, Face, Ears, Shoulders, Back, Chest, Arms, Elbows, Hands, Sacrum. Buttock, Coccyx, Ischium, and Legs. Feet and Heels        Does the Patient have a Wound?  No noted wound(s)       Miguel Prevention initiated by RN: Yes  Wound Care Orders initiated by RN: No    Pressure Injury (Stage 3,4, Unstageable, DTI, NWPT, and Complex wounds) if present, place Wound referral order by RN under : No    New Ostomies, if present place, Ostomy referral order under : No     Nurse 1 eSignature: Electronically signed by Maria Isabel Dumont RN on 10/28/23 at 11:54 PM EDT    **SHARE this note so that the co-signing nurse can place an eSignature**    Nurse 2 eSignature: Electronically signed by Sonido Vargas RN on 10/29/23 at 12:14 AM EDT
Patient denies suicidal ideation, homicidal ideations and AVH. Presents calm and cooperative during assessment. Patient is out on the unit and is social with peers. Medications taken without issue. No complaints or concerns verbalized at this time. No unit problems reported. Will continue to observe and support.
Patient is awake, alert, oriented x4. He is elevated, grandiose but may be baseline. He reports he slept well. He rates his anxiety 4/10 and denies depression. He states he believes he is going home today, and that he was ready this weekend so he wants to go today. He is a 2 hour lockout and has been trying to manipulate staff to let him in his room but he is redirectable. He has been med compliant. Encouraged patient to attend groups and participate in appropriate milieu activity. Will continue to monitor.
Patient is resting quietly in bed with eyes closed at this time. No signs of distress or discomfort noted. No PRN medications given thus far. Safety needs met. No unit problems reported. Purposeful rounding continued.
Patient is resting quietly in bed with eyes closed at this time. No signs of distress or discomfort noted. No PRN medications given thus far. Safety needs met. No unit problems reported. Purposeful rounding continued.
Patient resting in bed with eyes closed. Respirations even and unlabored. No signs of distress. Purposeful rounding continued.
Patient resting in room with eyes closed. No signs or symptoms of distress noted or observed. Will continue to monitor, provide needs, and ensure safe environment. Q15 minute checks continued.
Pt resting in room with eyes closed. Q15min checks continue.
resources  ( ) Referral for counseling faxed to [facility]                                                                                                                   ( ) Patient refused counseling  ( ) Patient refused referral  ( ) Patient refused prescription upon discharge  ( ) Patient has not smoked in the last 30 days    Metabolic Screening:    Lab Results   Component Value Date    LABA1C 5.5 10/19/2023       Lab Results   Component Value Date    CHOL 179 10/19/2023     Lab Results   Component Value Date    TRIG 66 10/19/2023     Lab Results   Component Value Date     10/19/2023     No components found for: \"LDLCAL\"  No results found for: \"LABVLDL\"    Zoe Fleischer, RN
Corrective Lenses: None  Hearing Aid: None  Jewelry: None  Body Piercings Removed: No  Clothing: Belt, Footwear, Gloves, Pants, Socks, Shirt (Only one glove.)  Other Valuables: Other (Comment) (None)  The following personal items were collected during admission. Items secured in locker/safe. Items will be returned to patient at discharge.      Rafal Murray RN

## 2024-02-06 ENCOUNTER — HOSPITAL ENCOUNTER (EMERGENCY)
Age: 34
Discharge: OTHER INSTITUTION | End: 2024-02-07
Attending: EMERGENCY MEDICINE
Payer: MEDICAID

## 2024-02-06 ENCOUNTER — APPOINTMENT (OUTPATIENT)
Dept: GENERAL RADIOLOGY | Age: 34
End: 2024-02-06
Payer: MEDICAID

## 2024-02-06 DIAGNOSIS — F19.10 POLYSUBSTANCE ABUSE (HCC): Primary | ICD-10-CM

## 2024-02-06 DIAGNOSIS — R07.9 CHEST PAIN, UNSPECIFIED TYPE: ICD-10-CM

## 2024-02-06 LAB
ALBUMIN SERPL-MCNC: 4.8 G/DL (ref 3.5–5.2)
ALP SERPL-CCNC: 60 U/L (ref 40–129)
ALT SERPL-CCNC: 17 U/L (ref 0–40)
AMPHET UR QL SCN: POSITIVE
ANION GAP SERPL CALCULATED.3IONS-SCNC: 11 MMOL/L (ref 7–16)
APAP SERPL-MCNC: <5 UG/ML (ref 10–30)
AST SERPL-CCNC: 19 U/L (ref 0–39)
BACTERIA URNS QL MICRO: ABNORMAL
BARBITURATES UR QL SCN: NEGATIVE
BASOPHILS # BLD: 0.07 K/UL (ref 0–0.2)
BASOPHILS NFR BLD: 1 % (ref 0–2)
BENZODIAZ UR QL: NEGATIVE
BILIRUB SERPL-MCNC: 0.6 MG/DL (ref 0–1.2)
BILIRUB UR QL STRIP: ABNORMAL
BUN SERPL-MCNC: 11 MG/DL (ref 6–20)
BUPRENORPHINE UR QL: POSITIVE
CALCIUM SERPL-MCNC: 10 MG/DL (ref 8.6–10.2)
CANNABINOIDS UR QL SCN: POSITIVE
CHLORIDE SERPL-SCNC: 102 MMOL/L (ref 98–107)
CLARITY UR: CLEAR
CO2 SERPL-SCNC: 29 MMOL/L (ref 22–29)
COCAINE UR QL SCN: POSITIVE
COLOR UR: YELLOW
CREAT SERPL-MCNC: 1 MG/DL (ref 0.7–1.2)
CRYSTALS URNS MICRO: ABNORMAL /HPF
D DIMER: <200 NG/ML DDU (ref 0–232)
EOSINOPHIL # BLD: 0.05 K/UL (ref 0.05–0.5)
EOSINOPHILS RELATIVE PERCENT: 1 % (ref 0–6)
EPI CELLS #/AREA URNS HPF: ABNORMAL /HPF
ERYTHROCYTE [DISTWIDTH] IN BLOOD BY AUTOMATED COUNT: 12.1 % (ref 11.5–15)
ETHANOLAMINE SERPL-MCNC: <10 MG/DL
FENTANYL UR QL: POSITIVE
GFR SERPL CREATININE-BSD FRML MDRD: >60 ML/MIN/1.73M2
GLUCOSE SERPL-MCNC: 80 MG/DL (ref 74–99)
GLUCOSE UR STRIP-MCNC: NEGATIVE MG/DL
HCT VFR BLD AUTO: 47.3 % (ref 37–54)
HGB BLD-MCNC: 16.3 G/DL (ref 12.5–16.5)
HGB UR QL STRIP.AUTO: NEGATIVE
IMM GRANULOCYTES # BLD AUTO: <0.03 K/UL (ref 0–0.58)
IMM GRANULOCYTES NFR BLD: 0 % (ref 0–5)
KETONES UR STRIP-MCNC: 15 MG/DL
LEUKOCYTE ESTERASE UR QL STRIP: NEGATIVE
LYMPHOCYTES NFR BLD: 1.96 K/UL (ref 1.5–4)
LYMPHOCYTES RELATIVE PERCENT: 24 % (ref 20–42)
MCH RBC QN AUTO: 28.9 PG (ref 26–35)
MCHC RBC AUTO-ENTMCNC: 34.5 G/DL (ref 32–34.5)
MCV RBC AUTO: 83.9 FL (ref 80–99.9)
METHADONE UR QL: NEGATIVE
MONOCYTES NFR BLD: 0.4 K/UL (ref 0.1–0.95)
MONOCYTES NFR BLD: 5 % (ref 2–12)
MUCOUS THREADS URNS QL MICRO: PRESENT
NEUTROPHILS NFR BLD: 70 % (ref 43–80)
NEUTS SEG NFR BLD: 5.81 K/UL (ref 1.8–7.3)
NITRITE UR QL STRIP: NEGATIVE
OPIATES UR QL SCN: NEGATIVE
OXYCODONE UR QL SCN: NEGATIVE
PCP UR QL SCN: NEGATIVE
PH UR STRIP: 6 [PH] (ref 5–9)
PLATELET # BLD AUTO: 417 K/UL (ref 130–450)
PMV BLD AUTO: 9 FL (ref 7–12)
POTASSIUM SERPL-SCNC: 3.8 MMOL/L (ref 3.5–5)
PROT SERPL-MCNC: 7.6 G/DL (ref 6.4–8.3)
PROT UR STRIP-MCNC: ABNORMAL MG/DL
RBC # BLD AUTO: 5.64 M/UL (ref 3.8–5.8)
RBC #/AREA URNS HPF: ABNORMAL /HPF
SALICYLATES SERPL-MCNC: <0.3 MG/DL (ref 0–30)
SODIUM SERPL-SCNC: 142 MMOL/L (ref 132–146)
SP GR UR STRIP: >1.03 (ref 1–1.03)
TEST INFORMATION: ABNORMAL
TOXIC TRICYCLIC SC,BLOOD: NEGATIVE
TROPONIN I SERPL HS-MCNC: 7 NG/L (ref 0–11)
TROPONIN I SERPL HS-MCNC: 7 NG/L (ref 0–11)
UROBILINOGEN UR STRIP-ACNC: 2 EU/DL (ref 0–1)
WBC #/AREA URNS HPF: ABNORMAL /HPF
WBC OTHER # BLD: 8.3 K/UL (ref 4.5–11.5)

## 2024-02-06 PROCEDURE — 81001 URINALYSIS AUTO W/SCOPE: CPT

## 2024-02-06 PROCEDURE — 85025 COMPLETE CBC W/AUTO DIFF WBC: CPT

## 2024-02-06 PROCEDURE — 85379 FIBRIN DEGRADATION QUANT: CPT

## 2024-02-06 PROCEDURE — 80053 COMPREHEN METABOLIC PANEL: CPT

## 2024-02-06 PROCEDURE — 71046 X-RAY EXAM CHEST 2 VIEWS: CPT

## 2024-02-06 PROCEDURE — 84484 ASSAY OF TROPONIN QUANT: CPT

## 2024-02-06 PROCEDURE — 99284 EMERGENCY DEPT VISIT MOD MDM: CPT

## 2024-02-06 PROCEDURE — 80143 DRUG ASSAY ACETAMINOPHEN: CPT

## 2024-02-06 PROCEDURE — 80307 DRUG TEST PRSMV CHEM ANLYZR: CPT

## 2024-02-06 PROCEDURE — G0480 DRUG TEST DEF 1-7 CLASSES: HCPCS

## 2024-02-06 PROCEDURE — 80179 DRUG ASSAY SALICYLATE: CPT

## 2024-02-06 ASSESSMENT — HEART SCORE: ECG: 0

## 2024-02-06 NOTE — ED NOTES
Call to [facility] [phone] to inquire on status of referral, spoke with Edilma, reports pt is scheduled to come into their facility tomorrow 2/7/2024.  Explained Bo of [facility] had spoken with Elmer earlier in the day, requested info was faxed as requested to [phone] and Bo was under the impression pt would be admitted today.  Edilma reports she has just come on shift, she will contact Elmer to ask about status. Gave our ER number [phone] to contact us re: status of referral.

## 2024-02-06 NOTE — CARE COORDINATION
[facility] Note       Name: Tejinder Johnson  Date:02/06/2024        Chief Complaint   Patient presents with    Psychiatric Evaluation     Sent in by  for detox, last use heroin yesterday. Denies SI/HI. Mandated for failing UDS.            [facility] met with patient.  [x] Support and education provided  [] Resources provided   [x] Treatment referral: [facility]  [] Other:   [] Patient declined peer recovery services      Referred By: Therese SO     Notes:   Patient was agreeable to treatment, intake process completed by [facility]. Treatment Facility waiting for H&P fax for medical clearance,  to complete.    Electronically signed by Bo Pavon on 2/6/2024 at 4:23 PM

## 2024-02-06 NOTE — ED NOTES
Bo from [facility] spoke with pt, requested that referral packet be faxed to [phone], this was done.

## 2024-02-06 NOTE — ED NOTES
Call from Elmer of [facility], reports that pt is accepted at their facility but they cannot admit him until tomorrow AM.  Reports they will send transport tomorrow AM, no time specified. Supervisor Kristina Keen made aware.    Paroxysmal atrial fibrillation

## 2024-02-06 NOTE — ED NOTES
Behavioral Health Crisis Assessment    Chief Complaint: came in for detox    Mental Status Exam: agitated, flat affect, calm and cooperative, denies SI, no HI and no hallucinations, alert, oriented x's 3, shared fair eye contact, thoughts are stable and lucid    Legal Status:  [x] Voluntary:  [] Involuntary, Issued by:    Gender:  [x] Male [] Female [] Transgender  [] Other    Sexual Orientation:  [x] Heterosexual [] Homosexual [] Bisexual [] Other    Brief Clinical Summary:  Pt was last admitted on 10/29/23 with suicidal ideations.  Today, pt is denying suicidal and homicidal ideations.  He explained that he is on probation and \"tested dirty\" and now his  advised he come in for AOD treatment and placement.  Pt admitted that he relapsed on heroin and cocaine and he is now following PO order.    Pt is not mandated as a probate, but is following through as a recommendation through his probation.       Collateral Information:  I called pt's mom Leah [phone], who reported to have no concerns for his safety at this time.    Risk Factors:  Mental Health Diagnosis   Substance Use  Prior suicide attempts  Trouble with the law    Protective Factors:  Outpatient provider  Initiated this ER visit - walk in from probation  Help-seeking behavior  Good insight to his needs  Goals & Hope for the future  Safe and stable housing  Has access to essential needs  Good communication Skills  Active in the community  No access to weapons     Suicidal Ideations:  [] Reports:    [x] Past [] Present   [x] Denies    Suicide Attempts:  [x] Reports: tried to hang himself in penitentiary  [] Denies    C-SSRS Screening Completed by RN: Current Suicide Risk:  [x] No Risk [] Low [] Moderate [] High    Homicidal Ideations:  [] Reports:   [] Past [] Present   [x] Denies     Self Injurious/Self Mutilation Behaviors:  [] Reports:    [] Past [] Present   [x] Denies    Hallucinations/Delusions:  [] Reports:   [x] Denies     Substance

## 2024-02-06 NOTE — ED NOTES
Department of Emergency Medicine  FIRST PROVIDER TRIAGE NOTE             Independent MLP           2/6/24  12:55 PM EST    Date of Encounter: 2/6/24   MRN: [id number]      HPI: Tejinder Johnson is a 33 y.o. male who presents to the ED for Psychiatric Evaluation (Sent in by  for detox, last use heroin yesterday. Denies SI/HI. Mandated for failing UDS. )     Patient is a 81-year-old being sent in by his .  Patient states that he is a polysubstance abuser and states that he failed a drug test and his  sent him in.  Patient is being mandated here.  Patient denies any SI or HI.    ROS: Negative for cp, sob, abd pain, or back pain.    PE: Gen Appearance/Constitutional: alert  HEENT: NC/NT. PERRLA,  Airway patent.  Neck: supple     Initial Plan of Care: All treatment areas with department are currently occupied. Plan to order/Initiate the following while awaiting opening in ED: labs and imaging studies.  Initiate Treatment-Testing, Proceed toTreatment Area When Bed Available for ED Attending/MLP to Continue Care    Electronically signed by Laura Montesinos PA-C   DD: 2/6/24       Laura Montesinos PA-C  02/06/24 1665

## 2024-02-07 VITALS
TEMPERATURE: 97.9 F | OXYGEN SATURATION: 99 % | SYSTOLIC BLOOD PRESSURE: 114 MMHG | DIASTOLIC BLOOD PRESSURE: 74 MMHG | HEART RATE: 58 BPM | RESPIRATION RATE: 16 BRPM

## 2024-02-07 ASSESSMENT — PAIN - FUNCTIONAL ASSESSMENT: PAIN_FUNCTIONAL_ASSESSMENT: NONE - DENIES PAIN

## 2024-02-07 NOTE — ED NOTES
Pt going to [facility] this morning - remains stable, calm, cooperative, behavior controlled - no SI, no HI and no hallucinations

## 2024-02-07 NOTE — ED PROVIDER NOTES
8:11 AM EST    I received this patient at sign out    I have discussed the patient's initial exam, treatment, and plan of care with the out going physician.     Medically cleared at this time, cleared by social work and psychiatry.  Poses no threat to himself or others at this time.  [facility] be [facility].     --------------------------------- IMPRESSION AND DISPOSITION ---------------------------------    IMPRESSION  1. Polysubstance abuse (HCC)    2. Chest pain, unspecified type        DISPOSITION  Disposition: Discharge to [facility] detox  Patient condition is stable       Lisa Kumar,   02/07/24 0812    
There is no effusion or pneumothorax. The cardiomediastinal silhouette is without acute process. The osseous structures are without acute process.     No acute process.       No results found.      PAST MEDICAL HISTORY/Chronic Conditions Affecting Care      has a past medical history of Opioid use disorder.     EMERGENCY DEPARTMENT COURSE    Vitals:    Vitals:    02/06/24 1250 02/06/24 1256   BP:  (!) 132/93   Pulse: 76    Resp: 18    Temp: 97.6 °F (36.4 °C)    TempSrc: Oral    SpO2: 98%        Patient was given the following medications:  Medications - No data to display               Medical Decision Making/Differential Diagnosis:    CC/HPI Summary, Social Determinants of health, Records Reviewed, DDx, testing done/not done, ED Course, Reassessment, disposition considerations/shared decision making with patient, consults, disposition:           ED Course as of 02/06/24 1550   Tue Feb 06, 2024   1540 The following tests were interpreted by me:       [CD]   1540 Troponin:    Troponin, High Sensitivity 7 [CD]   1540 Comprehensive Metabolic Panel:    Sodium 142   Potassium 3.8   Chloride 102   CO2 29   Anion Gap 11   Glucose, Random 80   BUN,BUNPL 11   Creatinine 1.0   Est, Glom Filt Rate >60   CALCIUM, SERUM, 351242 10.0   Total Protein 7.6   Albumin 4.8   BILIRUBIN TOTAL 0.6   Alk Phos 60   ALT 17   AST 19 [CD]   1540 Serum Drug Screen(!):    Acetaminophen Level <5(!)   ETHANOL,ETHA <10   Salicylate Lvl <0.3   Toxic Tricyclic Sc,Blood NEGATIVE [CD]   1540 D-Dimer, Quantitative:    D-Dimer, Quant <200 [CD]   1540 CBC with Auto Differential:    WBC 8.3   RBC 5.64   Hemoglobin Quant 16.3   Hematocrit 47.3   MCV 83.9   MCH 28.9   MCHC 34.5   RDW 12.1   Platelet Count 417   MPV 9.0   Neutrophils % 70   Lymphocyte % 24   Monocytes % 5   Eosinophils % 1   Basophils % 1   Immature Granulocytes 0   Neutrophils Absolute 5.81   Lymphocytes Absolute 1.96   Monocytes Absolute 0.40   Eosinophils Absolute 0.05   Basophils Absolute

## 2024-02-07 NOTE — DISCHARGE INSTRUCTIONS
GO DIRECTLY TO [facility]     Please reach out to one of the following community providers for treatment and support.   Help Hotline: 211, [phone] or [phone]    [facility] Mental Health and Recovery Board [phone]  [facility] Recovery [facility] [phone]  [facility] Mental Health and Recovery Board [phone]  If you are in need of help and experiencing any barriers to care please contact help hotline 24 hours a day and/or your local board of mental health and recovery during normal business hours.   Detox program inpatient:   [facility] [phone]  [facility] [phone]  [facility] [phone]  [facility] [phone]  [facility] [phone]  VA services hotline [phone]    Outpatient programs  [facility] outpatient treatment [phone]. [phone]  [place] [facility] [phone]  [facility] [phone]  [facility] [phone]  VA services:   [facility] [phone]  [place] [phone]  [place] [phone]  [facility] [phone]  [facility] [phone]  [facility] [phone]  [place] [facility] [phone]   [facility] services [phone]  The counseling center [facility] [phone]    For additional resource support please feel free to contact the behavioral access line at [phone] or the Intensive outpatient department at [phone].

## 2024-02-07 NOTE — ED NOTES
Pt informs this rn that he doesn't feel well states he is hot/ cold \"I just don't feel well\" informed Dr Ricardo whom at this time was in section G and observed pt lying in bed with head covered and negative distress noted.  Aware of vital signs stated to inform him if pt exhibits signs of withdrawal then he will order something ,  when dr leaves pt then sits up in bed and begins laughing.  States Am I getting medicine? Informed him of what dr Ricardo stated.  Oh I know him he states then sits down in bed.

## 2024-02-07 NOTE — ED NOTES
Patient currently resting with blanket over head, respirations non-labored, no distress noted. Patient responds to voice, oriented x 4 when awake. Patient calm and cooperative at present. Patient denies SI, HI, or any hallucinations at present. Patient cooperative for vitals.

## 2024-02-07 NOTE — ED NOTES
Calm and cooperative.  Pt watching tv.  Denies any suicidal or homicidal ideation he reports he just needs help and wants to get detoxed.  Rest of assessment unremarkable at this time

## 2024-06-22 ENCOUNTER — APPOINTMENT (OUTPATIENT)
Dept: GENERAL RADIOLOGY | Age: 34
End: 2024-06-22
Payer: MEDICAID

## 2024-06-22 ENCOUNTER — HOSPITAL ENCOUNTER (EMERGENCY)
Age: 34
Discharge: PSYCHIATRIC OR SUBSTANCE USE FACILITY | End: 2024-06-23
Attending: EMERGENCY MEDICINE
Payer: MEDICAID

## 2024-06-22 DIAGNOSIS — R45.851 SUICIDAL IDEATION: Primary | ICD-10-CM

## 2024-06-22 DIAGNOSIS — R44.3 HALLUCINATIONS: ICD-10-CM

## 2024-06-22 LAB
ALBUMIN SERPL-MCNC: 4.6 G/DL (ref 3.5–5.2)
ALP SERPL-CCNC: 59 U/L (ref 40–129)
ALT SERPL-CCNC: 17 U/L (ref 0–40)
AMPHET UR QL SCN: NEGATIVE
ANION GAP SERPL CALCULATED.3IONS-SCNC: 10 MMOL/L (ref 7–16)
APAP SERPL-MCNC: <5 UG/ML (ref 10–30)
AST SERPL-CCNC: 26 U/L (ref 0–39)
BARBITURATES UR QL SCN: NEGATIVE
BASOPHILS # BLD: 0.09 K/UL (ref 0–0.2)
BASOPHILS NFR BLD: 2 % (ref 0–2)
BENZODIAZ UR QL: NEGATIVE
BILIRUB SERPL-MCNC: 0.3 MG/DL (ref 0–1.2)
BUN SERPL-MCNC: 20 MG/DL (ref 6–20)
BUPRENORPHINE UR QL: POSITIVE
CALCIUM SERPL-MCNC: 9.9 MG/DL (ref 8.6–10.2)
CANNABINOIDS UR QL SCN: POSITIVE
CHLORIDE SERPL-SCNC: 101 MMOL/L (ref 98–107)
CO2 SERPL-SCNC: 28 MMOL/L (ref 22–29)
COCAINE UR QL SCN: NEGATIVE
CREAT SERPL-MCNC: 0.8 MG/DL (ref 0.7–1.2)
DATE LAST DOSE: ABNORMAL
EOSINOPHIL # BLD: 0.3 K/UL (ref 0.05–0.5)
EOSINOPHILS RELATIVE PERCENT: 5 % (ref 0–6)
ERYTHROCYTE [DISTWIDTH] IN BLOOD BY AUTOMATED COUNT: 13.1 % (ref 11.5–15)
ETHANOLAMINE SERPL-MCNC: <10 MG/DL (ref 0–0.08)
FENTANYL UR QL: NEGATIVE
GFR, ESTIMATED: >90 ML/MIN/1.73M2
GLUCOSE SERPL-MCNC: 92 MG/DL (ref 74–99)
HCT VFR BLD AUTO: 42.2 % (ref 37–54)
HGB BLD-MCNC: 14 G/DL (ref 12.5–16.5)
IMM GRANULOCYTES # BLD AUTO: <0.03 K/UL (ref 0–0.58)
IMM GRANULOCYTES NFR BLD: 0 % (ref 0–5)
LYMPHOCYTES NFR BLD: 2.75 K/UL (ref 1.5–4)
LYMPHOCYTES RELATIVE PERCENT: 45 % (ref 20–42)
MCH RBC QN AUTO: 28.9 PG (ref 26–35)
MCHC RBC AUTO-ENTMCNC: 33.2 G/DL (ref 32–34.5)
MCV RBC AUTO: 87.2 FL (ref 80–99.9)
METHADONE UR QL: NEGATIVE
MONOCYTES NFR BLD: 0.45 K/UL (ref 0.1–0.95)
MONOCYTES NFR BLD: 7 % (ref 2–12)
NEUTROPHILS NFR BLD: 41 % (ref 43–80)
NEUTS SEG NFR BLD: 2.47 K/UL (ref 1.8–7.3)
OPIATES UR QL SCN: NEGATIVE
OXYCODONE UR QL SCN: NEGATIVE
PCP UR QL SCN: NEGATIVE
PLATELET # BLD AUTO: 356 K/UL (ref 130–450)
PMV BLD AUTO: 9.5 FL (ref 7–12)
POTASSIUM SERPL-SCNC: 4.6 MMOL/L (ref 3.5–5)
PROT SERPL-MCNC: 7.3 G/DL (ref 6.4–8.3)
RBC # BLD AUTO: 4.84 M/UL (ref 3.8–5.8)
SALICYLATES SERPL-MCNC: <0.3 MG/DL (ref 0–30)
SODIUM SERPL-SCNC: 139 MMOL/L (ref 132–146)
TEST INFORMATION: ABNORMAL
TME LAST DOSE: ABNORMAL H
TOXIC TRICYCLIC SC,BLOOD: NEGATIVE
VALPROATE SERPL-MCNC: <3 UG/ML (ref 50–100)
VANCOMYCIN DOSE: ABNORMAL MG
WBC OTHER # BLD: 6.1 K/UL (ref 4.5–11.5)

## 2024-06-22 PROCEDURE — 80164 ASSAY DIPROPYLACETIC ACD TOT: CPT

## 2024-06-22 PROCEDURE — 80143 DRUG ASSAY ACETAMINOPHEN: CPT

## 2024-06-22 PROCEDURE — 80053 COMPREHEN METABOLIC PANEL: CPT

## 2024-06-22 PROCEDURE — G0480 DRUG TEST DEF 1-7 CLASSES: HCPCS

## 2024-06-22 PROCEDURE — 99285 EMERGENCY DEPT VISIT HI MDM: CPT

## 2024-06-22 PROCEDURE — 85025 COMPLETE CBC W/AUTO DIFF WBC: CPT

## 2024-06-22 PROCEDURE — 73521 X-RAY EXAM HIPS BI 2 VIEWS: CPT

## 2024-06-22 PROCEDURE — 80179 DRUG ASSAY SALICYLATE: CPT

## 2024-06-22 PROCEDURE — 80307 DRUG TEST PRSMV CHEM ANLYZR: CPT

## 2024-06-22 PROCEDURE — 93005 ELECTROCARDIOGRAM TRACING: CPT

## 2024-06-22 PROCEDURE — 6370000000 HC RX 637 (ALT 250 FOR IP): Performed by: EMERGENCY MEDICINE

## 2024-06-22 RX ORDER — NICOTINE 21 MG/24HR
1 PATCH, TRANSDERMAL 24 HOURS TRANSDERMAL ONCE
Status: COMPLETED | OUTPATIENT
Start: 2024-06-22 | End: 2024-06-23

## 2024-06-22 RX ORDER — TRAZODONE HYDROCHLORIDE 50 MG/1
50 TABLET ORAL NIGHTLY
Status: DISCONTINUED | OUTPATIENT
Start: 2024-06-22 | End: 2024-06-23 | Stop reason: HOSPADM

## 2024-06-22 RX ADMIN — TRAZODONE HYDROCHLORIDE 50 MG: 50 TABLET ORAL at 22:13

## 2024-06-22 NOTE — ED PROVIDER NOTES
Department of Emergency Medicine   ED Provider Note  Admit Date/RoomTime: 6/22/2024  5:03 PM  ED Room: [facility]          History of Present Illness:  6/22/24, Time: 5:21 PM EDT       Tejinder Johnson is a 33 y.o. male presenting to the ED for psych eval.  He notes he has having suicidal ideation.  He has no plan.  Notes he does Occasionally have thoughts of harming others however no plan to harm others.  He notes he does not have thoughts of harming anybody in particular.  Does report he is having visual and auditory hallucinations.  Patient does report he has had some hip pain from bumping into things.    Review of Systems:     Pertinent positives and negatives are stated within HPI.      --------------------------------------------- PAST HISTORY ---------------------------------------------  Past Medical History:  has a past medical history of Opioid use disorder.    Past Surgical History:  has a past surgical history that includes hernia repair (Left, 10/9/2019).    Social History:  reports that he has been smoking cigarettes. He started smoking about 21 years ago. He has a 10.9 pack-year smoking history. He has never used smokeless tobacco. He reports that he does not currently use alcohol. He reports current drug use. Drugs: Opiates , Marijuana (Weed), Cocaine, and Methamphetamines (Crystal Meth).    Family History: family history includes Hypertension in his mother; Pancreatic Cancer (age of onset: 56) in his father.     The patient’s home medications have been reviewed.    Allergies: Iodine and Shellfish-derived products      ---------------------------------------------------PHYSICAL EXAM--------------------------------------    Physical Exam  Vitals and nursing note reviewed.   Constitutional:       General: He is not in acute distress.     Appearance: Normal appearance.   HENT:      Head: Normocephalic and atraumatic.      Mouth/Throat:      Mouth: Mucous membranes are moist.      Pharynx: Oropharynx is clear.

## 2024-06-23 VITALS
DIASTOLIC BLOOD PRESSURE: 74 MMHG | OXYGEN SATURATION: 100 % | HEART RATE: 44 BPM | RESPIRATION RATE: 17 BRPM | TEMPERATURE: 98.2 F | SYSTOLIC BLOOD PRESSURE: 121 MMHG

## 2024-06-23 NOTE — ED NOTES
Attempted to call nurse to nurse report x2 but the number [phone] phone message is full and can not accept new messages.  Unable to give nurse to nurse report.

## 2024-06-23 NOTE — ED NOTES
Patient has been accepted to [facility] by by Balwinder Davenport MD.     [facility]     N2N [phone]    Involuntary hold will be faxed to # [phone]    Awaiting for transportation to be arranged
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
## 2024-06-23 NOTE — ED NOTES
Carlita ENNIS from access center called, said [facility] willing to accept but their insurance verification system is down so they need proof of insurance faxed to them. This RN asked registration to print out proof that insurance checked out after [patient was registered.

## 2024-06-23 NOTE — ED NOTES
called [facility] and request for transport to be outsourced due to 4-5-hour ETA.     Awaiting a call back.

## 2024-06-23 NOTE — ED NOTES
Tatiana from the [facility] called.  They have tried multiple other ambulance services and at this time there is no sooner truck available.  They will continue to work on a sooner ETA for an ambulance service.

## 2024-06-23 NOTE — ED NOTES
Jagruti from [facility] called stated that the ETA of [facility] is 4-5 hours.

## 2024-06-24 LAB
EKG ATRIAL RATE: 50 BPM
EKG P-R INTERVAL: 156 MS
EKG Q-T INTERVAL: 466 MS
EKG QRS DURATION: 84 MS
EKG QTC CALCULATION (BAZETT): 424 MS
EKG R AXIS: 88 DEGREES
EKG T AXIS: 62 DEGREES
EKG VENTRICULAR RATE: 50 BPM

## 2024-06-24 PROCEDURE — 93010 ELECTROCARDIOGRAM REPORT: CPT | Performed by: INTERNAL MEDICINE

## 2024-08-16 ENCOUNTER — HOSPITAL ENCOUNTER (INPATIENT)
Age: 34
LOS: 7 days | Discharge: HOME | End: 2024-08-23
Attending: EMERGENCY MEDICINE | Admitting: PSYCHIATRY & NEUROLOGY
Payer: MEDICAID

## 2024-08-16 DIAGNOSIS — R45.851 SUICIDAL IDEATION: Primary | ICD-10-CM

## 2024-08-16 LAB
ALBUMIN SERPL-MCNC: 3.7 G/DL (ref 3.5–5.2)
ALP SERPL-CCNC: 59 U/L (ref 40–129)
ALT SERPL-CCNC: 13 U/L (ref 0–40)
AMPHET UR QL SCN: POSITIVE
ANION GAP SERPL CALCULATED.3IONS-SCNC: 11 MMOL/L (ref 7–16)
APAP SERPL-MCNC: <5 UG/ML (ref 10–30)
AST SERPL-CCNC: 23 U/L (ref 0–39)
BARBITURATES UR QL SCN: NEGATIVE
BASOPHILS # BLD: 0.07 K/UL (ref 0–0.2)
BASOPHILS NFR BLD: 1 % (ref 0–2)
BENZODIAZ UR QL: NEGATIVE
BILIRUB SERPL-MCNC: <0.2 MG/DL (ref 0–1.2)
BILIRUB UR QL STRIP: NEGATIVE
BUN SERPL-MCNC: 15 MG/DL (ref 6–20)
BUPRENORPHINE UR QL: POSITIVE
CALCIUM SERPL-MCNC: 8.6 MG/DL (ref 8.6–10.2)
CANNABINOIDS UR QL SCN: POSITIVE
CHLORIDE SERPL-SCNC: 103 MMOL/L (ref 98–107)
CK SERPL-CCNC: 84 U/L (ref 20–200)
CLARITY UR: CLEAR
CO2 SERPL-SCNC: 25 MMOL/L (ref 22–29)
COCAINE UR QL SCN: NEGATIVE
COLOR UR: YELLOW
CREAT SERPL-MCNC: 0.9 MG/DL (ref 0.7–1.2)
DATE LAST DOSE: ABNORMAL
EOSINOPHIL # BLD: 0.31 K/UL (ref 0.05–0.5)
EOSINOPHILS RELATIVE PERCENT: 5 % (ref 0–6)
ERYTHROCYTE [DISTWIDTH] IN BLOOD BY AUTOMATED COUNT: 12.8 % (ref 11.5–15)
ETHANOLAMINE SERPL-MCNC: <10 MG/DL (ref 0–0.08)
FENTANYL UR QL: NEGATIVE
GFR, ESTIMATED: >90 ML/MIN/1.73M2
GLUCOSE SERPL-MCNC: 86 MG/DL (ref 74–99)
GLUCOSE UR STRIP-MCNC: NEGATIVE MG/DL
HCT VFR BLD AUTO: 38.2 % (ref 37–54)
HGB BLD-MCNC: 13.1 G/DL (ref 12.5–16.5)
HGB UR QL STRIP.AUTO: NEGATIVE
IMM GRANULOCYTES # BLD AUTO: <0.03 K/UL (ref 0–0.58)
IMM GRANULOCYTES NFR BLD: 0 % (ref 0–5)
KETONES UR STRIP-MCNC: NEGATIVE MG/DL
LEUKOCYTE ESTERASE UR QL STRIP: NEGATIVE
LYMPHOCYTES NFR BLD: 3.4 K/UL (ref 1.5–4)
LYMPHOCYTES RELATIVE PERCENT: 52 % (ref 20–42)
MCH RBC QN AUTO: 29.9 PG (ref 26–35)
MCHC RBC AUTO-ENTMCNC: 34.3 G/DL (ref 32–34.5)
MCV RBC AUTO: 87.2 FL (ref 80–99.9)
METHADONE UR QL: NEGATIVE
MONOCYTES NFR BLD: 0.45 K/UL (ref 0.1–0.95)
MONOCYTES NFR BLD: 7 % (ref 2–12)
NEUTROPHILS NFR BLD: 35 % (ref 43–80)
NEUTS SEG NFR BLD: 2.3 K/UL (ref 1.8–7.3)
NITRITE UR QL STRIP: NEGATIVE
OPIATES UR QL SCN: NEGATIVE
OXYCODONE UR QL SCN: NEGATIVE
PCP UR QL SCN: NEGATIVE
PH UR STRIP: 6 [PH] (ref 5–9)
PLATELET # BLD AUTO: 284 K/UL (ref 130–450)
PMV BLD AUTO: 9.4 FL (ref 7–12)
POTASSIUM SERPL-SCNC: 4.1 MMOL/L (ref 3.5–5)
PROT SERPL-MCNC: 6.1 G/DL (ref 6.4–8.3)
PROT UR STRIP-MCNC: NEGATIVE MG/DL
RBC # BLD AUTO: 4.38 M/UL (ref 3.8–5.8)
SALICYLATES SERPL-MCNC: <0.3 MG/DL (ref 0–30)
SODIUM SERPL-SCNC: 139 MMOL/L (ref 132–146)
SP GR UR STRIP: >1.03 (ref 1–1.03)
TEST INFORMATION: ABNORMAL
TME LAST DOSE: ABNORMAL H
TOXIC TRICYCLIC SC,BLOOD: NEGATIVE
UROBILINOGEN UR STRIP-ACNC: 0.2 EU/DL (ref 0–1)
VALPROATE SERPL-MCNC: <3 UG/ML (ref 50–100)
VANCOMYCIN DOSE: ABNORMAL MG
WBC OTHER # BLD: 6.5 K/UL (ref 4.5–11.5)

## 2024-08-16 PROCEDURE — 6370000000 HC RX 637 (ALT 250 FOR IP): Performed by: PSYCHIATRY & NEUROLOGY

## 2024-08-16 PROCEDURE — 80179 DRUG ASSAY SALICYLATE: CPT

## 2024-08-16 PROCEDURE — 80053 COMPREHEN METABOLIC PANEL: CPT

## 2024-08-16 PROCEDURE — 80143 DRUG ASSAY ACETAMINOPHEN: CPT

## 2024-08-16 PROCEDURE — 93005 ELECTROCARDIOGRAM TRACING: CPT | Performed by: EMERGENCY MEDICINE

## 2024-08-16 PROCEDURE — 81003 URINALYSIS AUTO W/O SCOPE: CPT

## 2024-08-16 PROCEDURE — G0480 DRUG TEST DEF 1-7 CLASSES: HCPCS

## 2024-08-16 PROCEDURE — 80307 DRUG TEST PRSMV CHEM ANLYZR: CPT

## 2024-08-16 PROCEDURE — 99285 EMERGENCY DEPT VISIT HI MDM: CPT

## 2024-08-16 PROCEDURE — 80164 ASSAY DIPROPYLACETIC ACD TOT: CPT

## 2024-08-16 PROCEDURE — 1240000000 HC EMOTIONAL WELLNESS R&B

## 2024-08-16 PROCEDURE — 82550 ASSAY OF CK (CPK): CPT

## 2024-08-16 PROCEDURE — 85025 COMPLETE CBC W/AUTO DIFF WBC: CPT

## 2024-08-16 RX ORDER — ACETAMINOPHEN 325 MG/1
650 TABLET ORAL EVERY 6 HOURS PRN
Status: DISCONTINUED | OUTPATIENT
Start: 2024-08-16 | End: 2024-08-23 | Stop reason: HOSPADM

## 2024-08-16 RX ORDER — LANOLIN ALCOHOL/MO/W.PET/CERES
3 CREAM (GRAM) TOPICAL NIGHTLY PRN
Status: DISCONTINUED | OUTPATIENT
Start: 2024-08-16 | End: 2024-08-23 | Stop reason: HOSPADM

## 2024-08-16 RX ORDER — HALOPERIDOL 5 MG/ML
5 INJECTION INTRAMUSCULAR EVERY 6 HOURS PRN
Status: DISCONTINUED | OUTPATIENT
Start: 2024-08-16 | End: 2024-08-23 | Stop reason: HOSPADM

## 2024-08-16 RX ORDER — MAGNESIUM HYDROXIDE/ALUMINUM HYDROXICE/SIMETHICONE 120; 1200; 1200 MG/30ML; MG/30ML; MG/30ML
30 SUSPENSION ORAL PRN
Status: DISCONTINUED | OUTPATIENT
Start: 2024-08-16 | End: 2024-08-23 | Stop reason: HOSPADM

## 2024-08-16 RX ORDER — NICOTINE 21 MG/24HR
1 PATCH, TRANSDERMAL 24 HOURS TRANSDERMAL DAILY
Status: DISCONTINUED | OUTPATIENT
Start: 2024-08-16 | End: 2024-08-18

## 2024-08-16 RX ORDER — HALOPERIDOL 5 MG/1
5 TABLET ORAL EVERY 6 HOURS PRN
Status: DISCONTINUED | OUTPATIENT
Start: 2024-08-16 | End: 2024-08-23 | Stop reason: HOSPADM

## 2024-08-16 RX ORDER — HYDROXYZINE PAMOATE 50 MG/1
50 CAPSULE ORAL 3 TIMES DAILY PRN
Status: DISCONTINUED | OUTPATIENT
Start: 2024-08-16 | End: 2024-08-23 | Stop reason: HOSPADM

## 2024-08-16 RX ADMIN — Medication 3 MG: at 21:25

## 2024-08-16 ASSESSMENT — PATIENT HEALTH QUESTIONNAIRE - PHQ9
SUM OF ALL RESPONSES TO PHQ QUESTIONS 1-9: 2
SUM OF ALL RESPONSES TO PHQ QUESTIONS 1-9: 2
1. LITTLE INTEREST OR PLEASURE IN DOING THINGS: SEVERAL DAYS
SUM OF ALL RESPONSES TO PHQ9 QUESTIONS 1 & 2: 2
2. FEELING DOWN, DEPRESSED OR HOPELESS: SEVERAL DAYS
SUM OF ALL RESPONSES TO PHQ QUESTIONS 1-9: 2
SUM OF ALL RESPONSES TO PHQ QUESTIONS 1-9: 2

## 2024-08-16 ASSESSMENT — SLEEP AND FATIGUE QUESTIONNAIRES
DO YOU USE A SLEEP AID: NO
AVERAGE NUMBER OF SLEEP HOURS: 3
SLEEP PATTERN: DIFFICULTY FALLING ASLEEP;INSOMNIA;RESTLESSNESS
DO YOU HAVE DIFFICULTY SLEEPING: YES

## 2024-08-16 ASSESSMENT — LIFESTYLE VARIABLES
HOW MANY STANDARD DRINKS CONTAINING ALCOHOL DO YOU HAVE ON A TYPICAL DAY: PATIENT DOES NOT DRINK
HOW OFTEN DO YOU HAVE A DRINK CONTAINING ALCOHOL: NEVER

## 2024-08-16 NOTE — BH NOTE
[facility] [place]  Admission Note     Admission Type:   Admission Type: Involuntary    Reason for admission:  Reason for Admission: I am depressed and have been having suicidal thoughts for a couple days.      Addictive Behavior:   Addictive Behavior  In the Past 3 Months, Have You Felt or Has Someone Told You That You Have a Problem With  : None    Medical Problems:   Past Medical History:   Diagnosis Date    Opioid use disorder        Status EXAM:  Mental Status and Behavioral Exam  Normal: No  Level of Assistance: Independent/Self  Facial Expression: Flat  Affect: Unstable  Level of Consciousness: Alert  Frequency of Checks: 4 times per hour, close  Mood:Normal: No  Mood: Depressed, Anxious, Irritable  Motor Activity:Normal: No  Motor Activity: Decreased  Eye Contact: Poor  Observed Behavior: Guarded, Withdrawn  Sexual Misconduct History: Current - no  Preception: [place] to person, [place] to time, [place] to place, [place] to situation  Attention:Normal: No  Attention: Distractible  Thought Processes: Unremarkable  Thought Content:Normal: No  Thought Content: Poverty of content  Depression Symptoms: Feelings of hopelessess, Feelings of worthlessness, Feelings of helplessness, Increased irritability, Isolative, Loss of interest, Impaired concentration, Change in energy level  Anxiety Symptoms: Generalized  Deisi Symptoms: Poor judgment  Hallucinations: None (None voiced)  Delusions: No (None voiced on assesment)  Memory:Normal: Yes  Insight and Judgment: No  Insight and Judgment: Poor judgment, Poor insight    Tobacco Screening:  Practical Counseling, on admission, driss X, if applicable and completed (first 3 are required if patient doesn't refuse):            ( ) Recognizing danger situations (included triggers and roadblocks)                    ( ) Coping skills (new ways to manage stress,relaxation techniques, changing routine, distraction)                                                           ( )  Basic information about quitting (benefits of quitting, techniques in how to quit, available resources  ( ) [facility]                                                                                                                   (x ) Patient refused counseling  ( ) Patient has not smoked in the last 30 days    Metabolic Screening:    Lab Results   Component Value Date    LABA1C 5.5 10/19/2023       Lab Results   Component Value Date    CHOL 179 10/19/2023     Lab Results   Component Value Date    TRIG 66 10/19/2023     Lab Results   Component Value Date     10/19/2023     No components found for: \"LDLCAL\"  No components found for: \"LABVLDL\"      Body mass index is 19.01 kg/m².    BP Readings from Last 2 Encounters:   08/16/24 114/73   06/23/24 121/74       Recliner Assessment:  Patient is able to demonstrate the ability to move from a reclining position to an upright position within the recliner.    Pt admitted with followings belongings:     The following personal items were collected during admission. Items secured in locker/safe. Items will be returned to patient at discharge.     Vivek Ortega RN

## 2024-08-16 NOTE — ED NOTES
Per Dr. Fuller 1:1 constant observation not required in ED Section G, patient okay for 15 minute monitoring.

## 2024-08-16 NOTE — ED NOTES
Belongings patient arrived with were bagged, labeled, and stored in locker #27, includes a brown back pack.

## 2024-08-16 NOTE — BH NOTE
4 Eyes Skin Assessment     NAME:  Tejinder Johnson  YOB: 1990  MEDICAL RECORD NUMBER:  [id number]    The patient is being assessed for  Admission    I agree that at least one RN has performed a thorough Head to Toe Skin Assessment on the patient. ALL assessment sites listed below have been assessed.      Areas assessed by both nurses:    Head, Face, Ears, Shoulders, Back, Chest, Arms, Elbows, Hands, Sacrum. Buttock, Coccyx, Ischium, and Legs. Feet and Heels        Does the Patient have a Wound? No noted wound(s)       Miguel Prevention initiated by RN: No  Wound Care Orders initiated by RN: No    Pressure Injury (Stage 3,4, Unstageable, DTI, NWPT, and Complex wounds) if present, place Wound referral order by RN under : No    New Ostomies, if present place, Ostomy referral order under : No     Nurse 1 eSignature: Electronically signed by Vivek Ortega RN on 8/16/24 at 6:02 PM EDT    **SHARE this note so that the co-signing nurse can place an eSignature**    Nurse 2 eSignature: Electronically signed by Carmelina Dominguez RN on 8/16/24 at 11:03 PM EDT
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
## 2024-08-16 NOTE — ED PROVIDER NOTES
[facility] EMERGENCY DEPARTMENT  EMERGENCY DEPARTMENT ENCOUNTER        Pt Name: Tejinder Johnson  MRN: [id number]  Birthdate 1990  Date of evaluation: 8/16/2024  Provider: Autumn Cordova MD  PCP: No primary care provider on file.  Note Started: 5:02 AM EDT 8/16/24    CHIEF COMPLAINT       Chief Complaint   Patient presents with    Suicidal     Patient states he's been having suicidal thoughts for a couple of days but lost his job today so it's worse. Jumped in traffic yesterday but the car swerved to miss him.        HISTORY OF PRESENT ILLNESS: 1 or more Elements   History From: Patient    Limitations to history : None    Tejinder Johnson is a 33 y.o. male who presents for suicidal ideation.  Patient does plan to jump in front of traffic.  Denies homicidal ideation.  Denies hallucinations.  Denies alcohol or drug use.  He does endorse marijuana use yesterday.  Patient denies physical symptoms such as abdominal pain, nausea, vomiting, chest pain, shortness of breath, headache, fevers.    Nursing Notes were all reviewed and agreed with or any disagreements were addressed in the HPI.        REVIEW OF EXTERNAL NOTE :       Patient was last seen on 6/22/2024 for suicidal ideation and hallucinations    REVIEW OF SYSTEMS :           Positives and Pertinent negatives as per HPI.     SURGICAL HISTORY     Past Surgical History:   Procedure Laterality Date    HERNIA REPAIR Left 10/9/2019    LAPAROSCOPIC POSS OPEN LEFT INGUINAL HERNIA REPAIR WITH MESH performed by Antoine Little MD at [facility] OR       CURRENTMEDICATIONS       Previous Medications    DIVALPROEX (DEPAKOTE) 250 MG DR TABLET    Take 1 tablet by mouth daily    DIVALPROEX (DEPAKOTE) 500 MG DR TABLET    Take 1 tablet by mouth nightly    NICOTINE POLACRILEX (COMMIT) 2 MG LOZENGE    Take 1 lozenge by mouth every 2 hours as needed for Smoking cessation    OLANZAPINE (ZYPREXA) 2.5 MG TABLET    Take 1 tablet by mouth daily

## 2024-08-17 PROCEDURE — 6370000000 HC RX 637 (ALT 250 FOR IP): Performed by: NURSE PRACTITIONER

## 2024-08-17 PROCEDURE — 1240000000 HC EMOTIONAL WELLNESS R&B

## 2024-08-17 PROCEDURE — 90792 PSYCH DIAG EVAL W/MED SRVCS: CPT | Performed by: NURSE PRACTITIONER

## 2024-08-17 RX ORDER — OLANZAPINE 5 MG/1
5 TABLET ORAL NIGHTLY
Status: DISCONTINUED | OUTPATIENT
Start: 2024-08-17 | End: 2024-08-23 | Stop reason: HOSPADM

## 2024-08-17 RX ORDER — DIVALPROEX SODIUM 250 MG/1
250 TABLET, DELAYED RELEASE ORAL EVERY 12 HOURS SCHEDULED
Status: DISCONTINUED | OUTPATIENT
Start: 2024-08-17 | End: 2024-08-22

## 2024-08-17 RX ADMIN — DIVALPROEX SODIUM 250 MG: 250 TABLET, DELAYED RELEASE ORAL at 20:49

## 2024-08-17 RX ADMIN — OLANZAPINE 5 MG: 5 TABLET, FILM COATED ORAL at 20:49

## 2024-08-17 ASSESSMENT — PATIENT HEALTH QUESTIONNAIRE - PHQ9
SUM OF ALL RESPONSES TO PHQ9 QUESTIONS 1 & 2: 4
SUM OF ALL RESPONSES TO PHQ QUESTIONS 1-9: 14
8. MOVING OR SPEAKING SO SLOWLY THAT OTHER PEOPLE COULD HAVE NOTICED. OR THE OPPOSITE, BEING SO FIGETY OR RESTLESS THAT YOU HAVE BEEN MOVING AROUND A LOT MORE THAN USUAL: SEVERAL DAYS
9. THOUGHTS THAT YOU WOULD BE BETTER OFF DEAD, OR OF HURTING YOURSELF: MORE THAN HALF THE DAYS
SUM OF ALL RESPONSES TO PHQ QUESTIONS 1-9: 16
5. POOR APPETITE OR OVEREATING: SEVERAL DAYS
1. LITTLE INTEREST OR PLEASURE IN DOING THINGS: MORE THAN HALF THE DAYS
3. TROUBLE FALLING OR STAYING ASLEEP: MORE THAN HALF THE DAYS
SUM OF ALL RESPONSES TO PHQ QUESTIONS 1-9: 16
SUM OF ALL RESPONSES TO PHQ QUESTIONS 1-9: 16
4. FEELING TIRED OR HAVING LITTLE ENERGY: MORE THAN HALF THE DAYS
10. IF YOU CHECKED OFF ANY PROBLEMS, HOW DIFFICULT HAVE THESE PROBLEMS MADE IT FOR YOU TO DO YOUR WORK, TAKE CARE OF THINGS AT HOME, OR GET ALONG WITH OTHER PEOPLE: VERY DIFFICULT
2. FEELING DOWN, DEPRESSED OR HOPELESS: MORE THAN HALF THE DAYS
7. TROUBLE CONCENTRATING ON THINGS, SUCH AS READING THE NEWSPAPER OR WATCHING TELEVISION: MORE THAN HALF THE DAYS
6. FEELING BAD ABOUT YOURSELF - OR THAT YOU ARE A FAILURE OR HAVE LET YOURSELF OR YOUR FAMILY DOWN: MORE THAN HALF THE DAYS

## 2024-08-17 ASSESSMENT — PAIN SCALES - GENERAL: PAINLEVEL_OUTOF10: 0

## 2024-08-17 ASSESSMENT — SLEEP AND FATIGUE QUESTIONNAIRES
DO YOU HAVE DIFFICULTY SLEEPING: YES
SLEEP PATTERN: DIFFICULTY FALLING ASLEEP
DO YOU USE A SLEEP AID: NO
AVERAGE NUMBER OF SLEEP HOURS: 3

## 2024-08-17 NOTE — H&P
Department of Psychiatry  History and Physical - Adult     CHIEF COMPLAINT: Suicidal ideation with plan to jump into traffic, depression    Patient was seen after discussing with the treatment team and reviewing the chart    CIRCUMSTANCES OF ADMISSION:     Tejinder Johnson is a 33 year old male with history of polysubstance abuse, mood disorder, suicide attempts,  with past psychiatric hospitalizations here, presenting to [facility] ER as a walk in reporting SI with plan to jump into traffic. Placed on involuntary hold by the ER  Precipitating events include recent loss of job, off medications for 5 days and polysubstance abuse, duration of acute symptoms have been on going worsening for a couple of days.     HISTORY OF PRESENT ILLNESS:      The patient is a 33 y.o. male with significant past history of polysubstance abuse, mood disorder, suicide attempts,  with past psychiatric hospitalizations here, presenting to [facility] ER as a walk in reporting SI with plan to jump into traffic. Placed on involuntary hold by the ER DR. ABBOTT in ED is positive for buprenorphine methamphetamine and cannabinoid.  QTc is 420.  Medically cleared and admitted to [facility] for psychiatric evaluation and stabilization    On evaluation today patient is extremely uncooperative, he is irritable easily agitated.  He yells at me for asking the same questions that someone else is already asked him and demands to know why he has to answer the same questions sometimes.  I do explain to the patient that I am the nurse practitioner and I need to speak with him so we can determine treatment, and he does not interact with me or speak to me further.  Information in H&P is obtained largely through chart review      Past psychiatric history:  Patient has 2 previous hospitalizations here, in 2023, and was recently at [facility] in June of 2024. He is not taking his medications, he is not following up with an OP provider. He endorses one previous suicide  dismissive congruent with stated mood  Thought processes: Unstable concrete  Thought content: Not able to be assessed patient not cooperative   Language: able to name objects and repeate phrases  Immediate recent remote memory intact  Cognition:  oriented to person, place, and time   Fund of Knowledge: Vocabulary intact, pt is aware of current events and past history  Attention and Concentration distractible  Judgment impulse control poor      DIAGNOSIS:  Mood disorder  Substance induced mood disorder  Polysubstance abuse   Antisocial behavior        LABS: REVIEWED TODAY:  Recent Labs     08/16/24 0431   WBC 6.5   HGB 13.1        Recent Labs     08/16/24 0431      K 4.1      CO2 25   BUN 15   CREATININE 0.9   GLUCOSE 86     Recent Labs     08/16/24 0431   BILITOT <0.2   ALKPHOS 59   AST 23   ALT 13     Lab Results   Component Value Date/Time    BARBSCNU NEGATIVE 08/16/2024 04:15 AM    LABBENZ NEGATIVE 08/16/2024 04:15 AM    LABMETH NEGATIVE 08/16/2024 04:15 AM    ETOH <10 08/16/2024 04:31 AM     No results found for: \"TSH\", \"FREET4\"  No results found for: \"LITHIUM\"  Lab Results   Component Value Date    VALPROATE <3 (L) 08/16/2024     Lab Results   Component Value Date/Time    VALPROATE <3 08/16/2024 04:31 AM         Radiology No results found.      TREATMENT PLAN:  The patient's diagnosis, treatment plan, medication management were formulated after patient was seen directly by the attending physician and myself and all relevant documentation was reviewed.    Risk, benefit, side effects, possible outcomes of the medication and alternatives discussed with the patient and the patient demonstrated understanding.  The patient was also educated that the outcome of treatment will depend on the medication compliance as directed by the prescribers along with regular follow-up, compliance with the labs and other work-up, as clinically indicated.      Risk Management: Based on the diagnosis and  assessment biopsychosocial treatment model was presented to the patient and was given the opportunity to ask any question.  The patient was agreeable to the plan and all the patient's questions were answered to the patient's satisfaction.  I discussed with the patient the risk, benefit, alternative and common side effects for the proposed medication treatment.  The patient is consenting to this treatment.     The patient was referred to outpatient/inpatient substance abuse rehabilitation programming.  He was educated multiple times during the hospitalization that if he chooses to continue to use drugs or alcohol, he may potentially act out impulsively, resulting in serious harm to self or others, even though unintentional.  He was also educated that mental health treatment cannot be optimized with ongoing use of drugs.  He demonstrated understanding has the capacity to understand that.    The patients risk factors have been mitigated as they have been admitted to inpatient behavioral health in an emotionally supportive environment with q 15 minute safety checks. Okay to discontinue the 1:1. They have the following  Risk factors:  History of mental health admissions  Substance abuse  History of legal problems and criminal activity  Isolated from family  Limited support system  Protective factors  Access to essential needs and shelter  Help seeking behavior  Collateral Information:  Will obtain collateral information from the family or friends.  Will obtain medical records as appropriate from out patient providers  Will consult the hospitalist for a physical exam to rule out any co-morbid physical condition.    Home medication Reconciled   Reviewed continued as clinically indicated  Patient UDS positive for buprenorphine however I do not see any wearing his med rec where he is receiving buprenorphine from a provider  OARRS report reviewed  New Medications started during this admission :        New Medications started

## 2024-08-17 NOTE — PROGRESS NOTES
Pt declined to complete leisure assessment at this time. Pt irritable, stated \"They've been asking me questions all day.\" Pt agreed to complete assessment at a later time.

## 2024-08-17 NOTE — CARE COORDINATION
Biopsychosocial Assessment Note    Social work met with patient to complete the biopsychosocial assessment and C-SSRS.     Chief Complaint: \"I'm suicidal\"    Mental Status Exam: Pt presents as A&Ox4, minimally cooperative and guarded with decreased motor activity and poor eye contact. He is depressed with some irritability, affect is flat. Pt thoughts are impoverished and he is not forthcoming with information. Pt insight/judgement poor. He denied HI/AVH, admits to SI with no plan. RN is aware.     Clinical Summary: Pt states that he is here because he has been feeling suicidal. He states that he is unsure how long he has been suicidal for and denied any plans for suicide. He states that this SI was triggered by \"life in general\". Per ED SW note, pt reported to SI for the past few days and attempted to commit suicide by jumping into traffic, but the car swerved and did not hit him. Pt reported in ED that he had lost his job, making his SI worse.     Pt has a hx of inpatient psychiatric treatment at [facility] and per chart, was recently discharged from [place]. He states that he is not active with an outpatient provider and has not been taking his mental health medications as prescribed. Per chart, he has not taken medications for 5 days. Pt denied hx of suicide attempts to SW, though per chart, he has hx of attempt by hanging himself years ago, but this was interrupted. Pt states that he has been feeling hopeless/helpless and has had little interest/pleasure in doing things recently. Denied trauma/abuse hx. Pt denied a legal hx, though per chart, has hx of misdemeanor for trespassing and menacing, also has hx of probation. Pt denied any substance use and declined substance abuse treatment referral. Pt UDS is positive for amphetamines, cannabis, and buprenorphine.     Pt states that he is homeless and unsure where he will go at discharge. He reports he is single with no children, though per chart, pt

## 2024-08-17 NOTE — PROGRESS NOTES
Speaks of being hospitalized on  unit before.    Confirms r/f admission on pink slip and that the details are accurate.   Declines to elaborate.       Also declines to come out of room very much.   DID see him come out 2 X this afterN.   come out of room to get suppertime meal tray.   Offered to launder pt's clothes; asked him to come out, inspect, and inform which clothes he wanted washed.   Pt made request to wear some of his own clothes.     [facility] to [facility] way; come out and dialogue.   Declined.   Laid in bed.    Provided notes of guidance on the dry erase board in his room.   Passively ackn that these were partly on track for helping him.    sar at present appears to have very limited motivation.

## 2024-08-17 NOTE — PLAN OF CARE
Patient has spent the majority of the day in bed sleeping. Breathing has been equal and unlabored with no signs of distress. Pt only came out for dinner and snack time. Patient Reports fleeting SI with no plan or intent. Pt took melatonin for sleep. Pt denies HI/AVH.     Problem: Risk for Elopement  Goal: Patient will not exit the unit/facility without proper excort  Outcome: Progressing     Problem: Anxiety  Goal: Will report anxiety at manageable levels  Description: INTERVENTIONS:  1. Administer medication as ordered  2. Teach and rehearse alternative coping skills  3. Provide emotional support with 1:1 interaction with staff  Outcome: Progressing     Problem: Coping  Goal: Pt/Family able to verbalize concerns and demonstrate effective coping strategies  Description: INTERVENTIONS:  1. Assist patient/family to identify coping skills, available support systems and cultural and spiritual values  2. Provide emotional support, including active listening and acknowledgement of concerns of patient and caregivers  3. Reduce environmental stimuli, as able  4. Instruct patient/family in relaxation techniques, as appropriate  5. Assess for spiritual pain/suffering and initiate Spiritual Care, Psychosocial Clinical Specialist consults as needed  Outcome: Progressing     Problem: Decision Making  Goal: Pt/Family able to effectively weigh alternatives and participate in decision making related to treatment and care  Description: INTERVENTIONS:  1. Determine when there are differences between patient's view, family's view, and healthcare provider's view of condition  2. Facilitate patient and family articulation of goals for care  3. Help patient and family identify pros/cons of alternative solutions  4. Provide information as requested by patient/family  5. Respect patient/family right to receive or not to receive information  6. Serve as a liaison between patient and family and health care team  7. Initiate Consults from  behavior  Description: INTERVENTIONS:  1. Administer medication as ordered  2. Monitor physical status  3. Provide emotional support with 1:1 interaction with staff  4. Encourage  recovery focused treatment   Outcome: Progressing     Problem: Involuntary Admit  Goal: Will cooperate with staff recommendations and doctor's orders and will demonstrate appropriate behavior  Description: INTERVENTIONS:  1. Treat underlying conditions and offer medication as ordered  2. Educate regarding involuntary admission procedures and rules  3. Contain excessive/inappropriate behavior per unit and hospital policies  Outcome: Progressing

## 2024-08-17 NOTE — BH NOTE
[facility]  Initial Interdisciplinary Treatment Plan Note      Original treatment plan Date & Time:  8-17-24   1000 am    Admission Type:  Admission Type: Involuntary    Reason for admission:   Reason for Admission: I am depressed and have been having suicidal thoughts for a couple days.    Estimated Length of Stay:  5-7days  Estimated Discharge Date: To be determined by physician.    PATIENT STRENGTHS:  Patient Strengths:   Patient Strengths and Limitations:   Addictive Behavior: Addictive Behavior  In the Past 3 Months, Have You Felt or Has Someone Told You That You Have a Problem With  : None  Medical Problems:  Past Medical History:   Diagnosis Date    Opioid use disorder      Status EXAM:Mental Status and Behavioral Exam  Normal: No  Level of Assistance: Independent/Self  Facial Expression: Flat  Affect: Blunt  Level of Consciousness: Alert  Frequency of Checks: 4 times per hour, close  Mood:Normal: No  Mood: Depressed, Irritable  Motor Activity:Normal: No  Motor Activity: Decreased  Eye Contact: Poor  Observed Behavior: Guarded, Cooperative  Sexual Misconduct History: Current - no  Preception: [place] to person, [place] to time, [place] to place, [place] to situation  Attention:Normal: No  Attention: Distractible  Thought Processes:  (impoverished)  Thought Content:Normal: No  Thought Content: Poverty of content  Depression Symptoms: Feelings of helplessness, Feelings of hopelessess, Loss of interest, Sleep disturbance, Change in energy level, Appetite change, Impaired concentration, Increased irritability  Anxiety Symptoms: Generalized  Deisi Symptoms: No problems reported or observed.  Hallucinations: None  Delusions: No  Memory:Normal: Yes  Memory: Other (comment) (n/a)  Insight and Judgment: No  Insight and Judgment: Poor judgment, Poor insight    EDUCATION:   Learner Progress Toward Treatment Goals: Will review group plans and strategies for care.    Method: Group therapy, Medication

## 2024-08-17 NOTE — BH NOTE
Pt reports suicidal thoughts without a plan.   Pt denies homicidal ideations.  Pt denies hallucinations.  Pt appears to be isolative to room. Pt has poverty of content in speech and thought.  Pt has poor eye contact.   No other immediate behavioral concerns at this time.

## 2024-08-17 NOTE — DISCHARGE INSTRUCTIONS
Patient was offered a referral to substance abuse treatment, patient declined referral at this time.      Follow up for Tobacco Cessation at:    Counts include 234 beds at the [facility]                                 Date:  Friday 8/30 at 10am              [address]    [place], [place] [address]   (Inside [facility]    take B elevators to 7th floor)   Phone: [phone]   Fax: [phone]     RESOURCES FOR HOMELESS:   [place] [facility]- [address] [facility] [address] Males [phone] Females [phone]   [place] [facility]- before 4  [address] [place], [place] [id number] [phone]     after 4 PM  [address] [place] [address]   [facility]- [address] [place] [address] [phone]   [facility]- [address], [place] [address] [phone]   [facility]- [address] [place] [address] [phone]   [facility]- [address] [facility] [address] [phone]   [facility]- [address] [place], [place] [id number] [phone]   [facility]- [address] [place], [place] [address] [phone]   [facility] [phone]   [facility]- Phone: [phone]   [facility]- [phone]   [facility] [phone] extension [phone]   [facility]- [phone]   [facility] homeless outreach program- [phone]   [facility] [phone]   [facility] [phone]   [facility]- [phone]   [facility] [phone]   [facility] [address] [facility] [place] [address] Phone: [phone]   [facility] Phone: [phone] [address] [facility], [place] [address]    [facility] Phone: [phone]    [address], [place] [address]   Phone:[phone]   Fax: [phone]     [facility]   [address], [place] [address]   Phone: [phone]   Fax: [phone]     [facility]    [address], [place] [address]   Phone: [phone]   Fax: [phone]     [facility] [place]   Address: [address], [place] [address]   Phone: [phone]   Fax: [phone]     [facility]   Address: [address] [facility] [address], [place] [address]   Phone: [phone]   Fax: [phone]     Dr. Mariscalolone   [address], [place] [address]   Phone:[phone]   Fax: [phone]     [facility]- counseling only   [address] [place] [address]   Phone: [phone]   Fax:[phone]     [facility]   [address] [place] [address]   Phone: [phone]   Fax: [phone]     [facility]   [address] [place] [address]   Phone: [phone]   Fax: [phone]     [facility] (only counseling no medications)    [address], [place] [address]   Phone: [phone]   Fax: [phone]     Keira DAY MD   [address] [place] [address]   Phone: [phone]   Fax:[phone]     [facility] [place]   [address] [place] [address]   Phone: [phone]   Fax: [phone]     [facility]   [address] [place] [address]   Phone: [phone]   Fax: [phone]

## 2024-08-17 NOTE — PROGRESS NOTES
Blood pressures controlled.  Continue home monitoring, Lotensin HCT, exercise.   Patient declined invitation to the following groups:    Community Meeting  Education    Patient will continue to be provided with opportunities to enhance leisure skills/interests and/or coping mechanisms.

## 2024-08-18 PROCEDURE — 6370000000 HC RX 637 (ALT 250 FOR IP): Performed by: NURSE PRACTITIONER

## 2024-08-18 PROCEDURE — 1240000000 HC EMOTIONAL WELLNESS R&B

## 2024-08-18 PROCEDURE — 99232 SBSQ HOSP IP/OBS MODERATE 35: CPT | Performed by: NURSE PRACTITIONER

## 2024-08-18 RX ORDER — POLYETHYLENE GLYCOL 3350 17 G
2 POWDER IN PACKET (EA) ORAL
Status: DISCONTINUED | OUTPATIENT
Start: 2024-08-18 | End: 2024-08-23 | Stop reason: HOSPADM

## 2024-08-18 RX ADMIN — DIVALPROEX SODIUM 250 MG: 250 TABLET, DELAYED RELEASE ORAL at 22:04

## 2024-08-18 RX ADMIN — DIVALPROEX SODIUM 250 MG: 250 TABLET, DELAYED RELEASE ORAL at 09:58

## 2024-08-18 RX ADMIN — OLANZAPINE 5 MG: 5 TABLET, FILM COATED ORAL at 22:04

## 2024-08-18 ASSESSMENT — PAIN SCALES - GENERAL
PAINLEVEL_OUTOF10: 0
PAINLEVEL_OUTOF10: 0

## 2024-08-18 NOTE — PROGRESS NOTES
Pt resting in bed with eyes closed. Respirations even and unlabored. No signs of distress. Q 15 minute checks continued.

## 2024-08-18 NOTE — PLAN OF CARE
Denies suicidal ideations or thoughts of self harm.  Denies homicidal ideations or thoughts to hurt others.  Denies auditory and visual hallucinations.  Fair eye contact, flat affect.  Refused on unit groups thus far today.  Medication compliant.  Up for meals.   Has remained in his room reclusive to self, able to make his needs known.

## 2024-08-18 NOTE — PROGRESS NOTES
BEHAVIORAL HEALTH FOLLOW-UP NOTE     8/18/2024     Patient was seen and examined in person, Chart reviewed   Patient's case discussed with staff/team    Chief Complaint: Suicidal ideation    Interim History:     Went to see the patient this morning in his room, he will not acknowledge me or interact with me.  Staff report that he had been in the day room and had his breakfast but then retreated to his room immediately after finishing his breakfast.  Staff report that he has been irritable not engaged dismissive and not participating in unit milieu    Appetite:   [] Normal/Unchanged  [] Increased  [] Decreased      Sleep:       [] Normal/Unchanged  [] Fair       [] Poor              Energy:    [] Normal/Unchanged  [] Increased  [] Decreased        SI [] Present  [] Absent    HI  []Present  [] Absent     Aggression:  [] yes  [] no    Patient is [] able  [] unable to CONTRACT FOR SAFETY     PAST MEDICAL/PSYCHIATRIC HISTORY:   Past Medical History:   Diagnosis Date    Opioid use disorder        FAMILY/SOCIAL HISTORY:  Family History   Problem Relation Age of Onset    Hypertension Mother     Pancreatic Cancer Father 56     Social History     Socioeconomic History    Marital status: Single     Spouse name: Not on file    Number of children: Not on file    Years of education: Not on file    Highest education level: Not on file   Occupational History     Employer: [employer]   Tobacco Use    Smoking status: Every Day     Current packs/day: 0.50     Average packs/day: 0.5 packs/day for 21.9 years (11.0 ttl pk-yrs)     Types: Cigarettes     Start date: 9/20/2002    Smokeless tobacco: Never    Tobacco comments:     Try to quit    Vaping Use    Vaping status: Never Used   Substance and Sexual Activity    Alcohol use: Not Currently    Drug use: Yes     Types: Opiates , Marijuana (Weed), Cocaine, Methamphetamines (Crystal Meth)     Comment: denies recnt use    Sexual activity: Not Currently     Partners: Female   Other Topics  TransDERmal, Daily, Aftab Christy MD    aluminum & magnesium hydroxide-simethicone (MAALOX) 200-200-20 MG/5ML suspension 30 mL, 30 mL, Oral, PRN, Aftab Christy MD    hydrOXYzine pamoate (VISTARIL) capsule 50 mg, 50 mg, Oral, TID PRN, Aftab Christy MD    haloperidol (HALDOL) tablet 5 mg, 5 mg, Oral, Q6H PRN **OR** haloperidol lactate (HALDOL) injection 5 mg, 5 mg, IntraMUSCular, Q6H PRN, Aftab Christy MD    melatonin tablet 3 mg, 3 mg, Oral, Nightly PRN, Aftab Christy MD, 3 mg at 08/16/24 2125      Examination:  BP (!) 102/55   Pulse 50   Temp 97.5 °F (36.4 °C) (Oral)   Resp 14   Ht 1.727 m (5' 8\")   Wt 56.7 kg (125 lb)   SpO2 98%   BMI 19.01 kg/m²   Gait - steady  Medication side effects(SE): None reported    Mental Status Examination:    Level of consciousness:  within normal limits   Appearance: Disheveled  Behavior/Motor: Psychomotor agitation 2/2 withdrawal  Attitude toward examiner: Uncooperative  Speech:  spontaneous, normal rate and normal volume  Mood: Not stated  Affect: Irritable dismissive congruent with stated mood  Thought processes: Unstable concrete  Thought content: Not able to be assessed patient not cooperative   Language: able to name objects and repeate phrases  Immediate recent remote memory intact  Cognition:  oriented to person, place, and time   Fund of Knowledge: Vocabulary intact, pt is aware of current events and past history  Attention and Concentration distractible  Judgment impulse control poor    ASSESSMENT:   Patient symptoms are:  [] Well controlled  [] Improving  [] Worsening  [x] No change      Diagnosis:   Principal Problem:    Mood disorder (HCC)  Resolved Problems:    * No resolved hospital problems. *      LABS:    Recent Labs     08/16/24 0431   WBC 6.5   HGB 13.1        Recent Labs     08/16/24  0431      K 4.1      CO2 25   BUN 15   CREATININE 0.9   GLUCOSE 86     Recent Labs     08/16/24 0431   BILITOT <0.2   ALKPHOS 59

## 2024-08-19 LAB
EKG ATRIAL RATE: 46 BPM
EKG P AXIS: 49 DEGREES
EKG P-R INTERVAL: 140 MS
EKG Q-T INTERVAL: 480 MS
EKG QRS DURATION: 92 MS
EKG QTC CALCULATION (BAZETT): 420 MS
EKG R AXIS: 91 DEGREES
EKG T AXIS: 68 DEGREES
EKG VENTRICULAR RATE: 46 BPM

## 2024-08-19 PROCEDURE — 6370000000 HC RX 637 (ALT 250 FOR IP): Performed by: PSYCHIATRY & NEUROLOGY

## 2024-08-19 PROCEDURE — 6370000000 HC RX 637 (ALT 250 FOR IP): Performed by: NURSE PRACTITIONER

## 2024-08-19 PROCEDURE — 99232 SBSQ HOSP IP/OBS MODERATE 35: CPT | Performed by: NURSE PRACTITIONER

## 2024-08-19 PROCEDURE — 93010 ELECTROCARDIOGRAM REPORT: CPT | Performed by: INTERNAL MEDICINE

## 2024-08-19 PROCEDURE — 1240000000 HC EMOTIONAL WELLNESS R&B

## 2024-08-19 RX ADMIN — OLANZAPINE 5 MG: 5 TABLET, FILM COATED ORAL at 21:32

## 2024-08-19 RX ADMIN — DIVALPROEX SODIUM 250 MG: 250 TABLET, DELAYED RELEASE ORAL at 21:32

## 2024-08-19 RX ADMIN — DIVALPROEX SODIUM 250 MG: 250 TABLET, DELAYED RELEASE ORAL at 09:36

## 2024-08-19 RX ADMIN — Medication 3 MG: at 21:32

## 2024-08-19 ASSESSMENT — PAIN SCALES - GENERAL: PAINLEVEL_OUTOF10: 0

## 2024-08-19 NOTE — PROGRESS NOTES
Patient declined verbal invitation to the following groups    Community Meeting  Education- Cultivating Happiness.  A handout was provided to patient on today's group topic.    Patient will continue to be provided with opportunities to enhance leisure skills/interests and/or coping mechanisms.

## 2024-08-19 NOTE — PROGRESS NOTES
BEHAVIORAL HEALTH FOLLOW-UP NOTE     8/19/2024     Patient was seen and examined in person, Chart reviewed   Patient's case discussed with staff/team    Chief Complaint: Suicidal ideation    Interim History:     Went to see the patient this morning in his room, with treatment team.  He is irritable isolate guarded not attending groups and socializing with peers not participating in treatment.  He tells me that he is feeling suicidal but he contracts for safety denies auditory or visual hallucinations        Appetite:   [x] Normal/Unchanged  [] Increased  [] Decreased      Sleep:       [x] Normal/Unchanged  [] Fair       [] Poor              Energy:    [] Normal/Unchanged  [] Increased  [x] Decreased        SI [x] Present  [] Absent    HI  []Present  [x] Absent     Aggression:  [] yes  [x] no    Patient is [x] able  [] unable to CONTRACT FOR SAFETY     PAST MEDICAL/PSYCHIATRIC HISTORY:   Past Medical History:   Diagnosis Date    Opioid use disorder        FAMILY/SOCIAL HISTORY:  Family History   Problem Relation Age of Onset    Hypertension Mother     Pancreatic Cancer Father 56     Social History     Socioeconomic History    Marital status: Single     Spouse name: Not on file    Number of children: Not on file    Years of education: Not on file    Highest education level: Not on file   Occupational History     Employer: [employer]   Tobacco Use    Smoking status: Every Day     Current packs/day: 0.50     Average packs/day: 0.5 packs/day for 21.9 years (11.0 ttl pk-yrs)     Types: Cigarettes     Start date: 9/20/2002    Smokeless tobacco: Never    Tobacco comments:     Try to quit    Vaping Use    Vaping status: Never Used   Substance and Sexual Activity    Alcohol use: Not Currently    Drug use: Yes     Types: Opiates , Marijuana (Weed), Cocaine, Methamphetamines (Crystal Meth)     Comment: denies recnt use    Sexual activity: Not Currently     Partners: Female   Other Topics Concern    Not on file   Social  aluminum & magnesium hydroxide-simethicone (MAALOX) 200-200-20 MG/5ML suspension 30 mL, 30 mL, Oral, PRN, Aftab Christy MD    hydrOXYzine pamoate (VISTARIL) capsule 50 mg, 50 mg, Oral, TID PRN, Aftab Christy MD    haloperidol (HALDOL) tablet 5 mg, 5 mg, Oral, Q6H PRN **OR** haloperidol lactate (HALDOL) injection 5 mg, 5 mg, IntraMUSCular, Q6H PRN, Aftab Christy MD    melatonin tablet 3 mg, 3 mg, Oral, Nightly PRN, Aftab Christy MD, 3 mg at 08/16/24 2125      Examination:  BP 99/64   Pulse 50   Temp 97.8 °F (36.6 °C)   Resp 14   Ht 1.727 m (5' 8\")   Wt 56.7 kg (125 lb)   SpO2 98%   BMI 19.01 kg/m²   Gait - steady  Medication side effects(SE): None reported    Mental Status Examination:    Level of consciousness:  within normal limits   Appearance: Disheveled  Behavior/Motor: Psychomotor agitation 2/2 withdrawal  Attitude toward examiner: Uncooperative  Speech:  spontaneous, normal rate and normal volume  Mood: Not stated  Affect: Irritable dismissive congruent with stated mood  Thought processes: Unstable concrete  Thought content: Denies auditory or visual hallucinations delusions or any other perceptual abnormalities endorses suicidal ideations contracts for safety denies homicidal ideations intent or plan  Language: able to name objects and repeate phrases  Immediate recent remote memory intact  Cognition:  oriented to person, place, and time   Fund of Knowledge: Vocabulary intact, pt is aware of current events and past history  Attention and Concentration distractible  Judgment impulse control poor    ASSESSMENT:   Patient symptoms are:  [] Well controlled  [] Improving  [] Worsening  [x] No change      Diagnosis:   Principal Problem:    Mood disorder (HCC)  Resolved Problems:    * No resolved hospital problems. *      LABS:    No results for input(s): \"WBC\", \"HGB\", \"PLT\" in the last 72 hours.    No results for input(s): \"NA\", \"K\", \"CL\", \"CO2\", \"BUN\", \"CREATININE\", \"GLUCOSE\" in the last

## 2024-08-19 NOTE — PLAN OF CARE
Patient with positive thoughts of self harm but has no specific plan when asked.  Denies homicidal ideations or thoughts to hurt others.  Denies auditory and visual hallucinations.  Fair eye contact when out of his room, otherwise poor eye contact as patient refuses to turn to look at staff, flat affect.  Refused on unit groups thus far today.  Medication compliant.  Up for meals.

## 2024-08-19 NOTE — CARE COORDINATION
SW met with pt in treatment team. Pt was seen laying in his room and did not make any eye contact with the team. Pt stated that he has been taking his medications and he was encouraged to go to groups. Pt reported to having SI but denied having any plan. Pt denied AVH.

## 2024-08-19 NOTE — PROGRESS NOTES
Patient declined verbal invitation to the following group    Therapeutic art- positive affirmations    Patient will continue to be provided with opportunities to enhance leisure skills/interests and/or coping mechanisms.

## 2024-08-19 NOTE — PLAN OF CARE
Problem: Risk for Elopement  Goal: Patient will not exit the unit/facility without proper excort  8/18/2024 2253 by Kay Greenwood RN  Outcome: Progressing  8/18/2024 1843 by Tammi Graves RN  Outcome: Progressing     Problem: Anxiety  Goal: Will report anxiety at manageable levels  Description: INTERVENTIONS:  1. Administer medication as ordered  2. Teach and rehearse alternative coping skills  3. Provide emotional support with 1:1 interaction with staff  8/18/2024 2253 by Kay Greenwood RN  Outcome: Progressing  8/18/2024 1843 by Tammi Graves RN  Outcome: Progressing     Problem: Coping  Goal: Pt/Family able to verbalize concerns and demonstrate effective coping strategies  Description: INTERVENTIONS:  1. Assist patient/family to identify coping skills, available support systems and cultural and spiritual values  2. Provide emotional support, including active listening and acknowledgement of concerns of patient and caregivers  3. Reduce environmental stimuli, as able  4. Instruct patient/family in relaxation techniques, as appropriate  5. Assess for spiritual pain/suffering and initiate Spiritual Care, Psychosocial Clinical Specialist consults as needed  8/18/2024 2253 by Kay Greenwood RN  Outcome: Progressing  8/18/2024 1843 by Tammi Graves RN  Outcome: Progressing     Problem: Decision Making  Goal: Pt/Family able to effectively weigh alternatives and participate in decision making related to treatment and care  Description: INTERVENTIONS:  1. Determine when there are differences between patient's view, family's view, and healthcare provider's view of condition  2. Facilitate patient and family articulation of goals for care  3. Help patient and family identify pros/cons of alternative solutions  4. Provide information as requested by patient/family  5. Respect patient/family right to receive or not to receive information  6. Serve as a liaison between patient and family and health care team  7.

## 2024-08-20 PROCEDURE — 6370000000 HC RX 637 (ALT 250 FOR IP): Performed by: PSYCHIATRY & NEUROLOGY

## 2024-08-20 PROCEDURE — 6370000000 HC RX 637 (ALT 250 FOR IP): Performed by: NURSE PRACTITIONER

## 2024-08-20 PROCEDURE — 99232 SBSQ HOSP IP/OBS MODERATE 35: CPT | Performed by: NURSE PRACTITIONER

## 2024-08-20 PROCEDURE — 1240000000 HC EMOTIONAL WELLNESS R&B

## 2024-08-20 RX ADMIN — DIVALPROEX SODIUM 250 MG: 250 TABLET, DELAYED RELEASE ORAL at 20:59

## 2024-08-20 RX ADMIN — Medication 3 MG: at 20:59

## 2024-08-20 RX ADMIN — NICOTINE POLACRILEX 2 MG: 2 LOZENGE ORAL at 16:28

## 2024-08-20 RX ADMIN — NICOTINE POLACRILEX 2 MG: 2 LOZENGE ORAL at 11:28

## 2024-08-20 RX ADMIN — DIVALPROEX SODIUM 250 MG: 250 TABLET, DELAYED RELEASE ORAL at 09:41

## 2024-08-20 RX ADMIN — OLANZAPINE 5 MG: 5 TABLET, FILM COATED ORAL at 20:59

## 2024-08-20 RX ADMIN — HYDROXYZINE PAMOATE 50 MG: 50 CAPSULE ORAL at 20:59

## 2024-08-20 RX ADMIN — NICOTINE POLACRILEX 2 MG: 2 LOZENGE ORAL at 19:27

## 2024-08-20 ASSESSMENT — PAIN SCALES - GENERAL
PAINLEVEL_OUTOF10: 0
PAINLEVEL_OUTOF10: 0

## 2024-08-20 NOTE — PLAN OF CARE
Problem: Risk for Elopement  Goal: Patient will not exit the unit/facility without proper excort  Outcome: Progressing     Problem: Anxiety  Goal: Will report anxiety at manageable levels  Description: INTERVENTIONS:  1. Administer medication as ordered  2. Teach and rehearse alternative coping skills  3. Provide emotional support with 1:1 interaction with staff  Outcome: Progressing     Problem: Decision Making  Goal: Pt/Family able to effectively weigh alternatives and participate in decision making related to treatment and care  Description: INTERVENTIONS:  1. Determine when there are differences between patient's view, family's view, and healthcare provider's view of condition  2. Facilitate patient and family articulation of goals for care  3. Help patient and family identify pros/cons of alternative solutions  4. Provide information as requested by patient/family  5. Respect patient/family right to receive or not to receive information  6. Serve as a liaison between patient and family and health care team  7. Initiate Consults from Ethics, Palliative Care or initiate Family Care Conference as is appropriate  Outcome: Progressing     Problem: Behavior  Goal: Pt/Family maintain appropriate behavior and adhere to behavioral management agreement, if implemented  Description: INTERVENTIONS:  1. Assess patient/family's coping skills and  non-compliant behavior (including use of illegal substances)  2. Notify security of behavior or suspected illegal substances which indicate the need for search of the family and/or belongings  3. Encourage verbalization of thoughts and concerns in a socially appropriate manner  4. Utilize positive, consistent limit setting strategies supporting safety of patient, staff and others  5. Encourage participation in the decision making process about the behavioral management agreement  6. If a visitor's behavior poses a threat to safety call refer to organization policy.  7. Initiate  consult with , Psychosocial CNS, Spiritual Care as appropriate  Outcome: Progressing     Problem: Depression/Self Harm  Goal: Effect of psychiatric condition will be minimized and patient will be protected from self harm  Description: INTERVENTIONS:  1. Assess impact of patient's symptoms on level of functioning, self care needs and offer support as indicated  2. Assess patient/family knowledge of depression, impact on illness and need for teaching  3. Provide emotional support, presence and reassurance  4. Assess for possible suicidal thoughts or ideation. If patient expresses suicidal thoughts or statements do not leave alone, initiate Suicide Precautions, move to a room close to the nursing station and obtain sitter  5. Initiate consults as appropriate with Mental Health Professional, Spiritual Care, Psychosocial CNS, and consider a recommendation to the LIP for a Psychiatric Consultation  Outcome: Progressing     Problem: Drug Abuse/Detox  Goal: Will have no detox symptoms and will verbalize plan for changing drug-related behavior  Description: INTERVENTIONS:  1. Administer medication as ordered  2. Monitor physical status  3. Provide emotional support with 1:1 interaction with staff  4. Encourage  recovery focused treatment   Outcome: Progressing     Problem: Involuntary Admit  Goal: Will cooperate with staff recommendations and doctor's orders and will demonstrate appropriate behavior  Description: INTERVENTIONS:  1. Treat underlying conditions and offer medication as ordered  2. Educate regarding involuntary admission procedures and rules  3. Contain excessive/inappropriate behavior per unit and hospital policies  Outcome: Progressing     Problem: Pain  Goal: Verbalizes/displays adequate comfort level or baseline comfort level  Outcome: Progressing  UP FOR MEALS ONLY. SUPERFICIAL ON APPROACH. GROUPS ENCOURAGED. PT. HAS BEEN MEDICATION COMPLIANT WITH NO COMPLAINTS. PT. REPORTED FLEETING SUICIDAL

## 2024-08-20 NOTE — PROGRESS NOTES
Patient declined verbal invitation to recreational activity- music and thought catalog trivia. Patient will continue to be provided with opportunities to enhance leisure skills/interests and/or coping mechanisms.

## 2024-08-20 NOTE — PROGRESS NOTES
BEHAVIORAL HEALTH FOLLOW-UP NOTE     8/20/2024     Patient was seen and examined in person, Chart reviewed   Patient's case discussed with staff/team    Chief Complaint: Suicidal ideation    Interim History:   I saw patient this morning out in the milieu when he came out of his room.  He is disheveled in appearance.  He is flat blunted evasive with questioning.  He tells me that he is feeling \"all right.\"  Continues to report fleeting suicidal ideations denies auditory or visual hallucinations states that he is feeling depressed.  He is not wanting any type of drug treatment.        Appetite:   [x] Normal/Unchanged  [] Increased  [] Decreased      Sleep:       [x] Normal/Unchanged  [] Fair       [] Poor              Energy:    [] Normal/Unchanged  [] Increased  [x] Decreased        SI [x] Present  [] Absent    HI  []Present  [x] Absent     Aggression:  [] yes  [x] no    Patient is [x] able  [] unable to CONTRACT FOR SAFETY     PAST MEDICAL/PSYCHIATRIC HISTORY:   Past Medical History:   Diagnosis Date    Opioid use disorder        FAMILY/SOCIAL HISTORY:  Family History   Problem Relation Age of Onset    Hypertension Mother     Pancreatic Cancer Father 56     Social History     Socioeconomic History    Marital status: Single     Spouse name: Not on file    Number of children: Not on file    Years of education: Not on file    Highest education level: Not on file   Occupational History     Employer: [employer]   Tobacco Use    Smoking status: Every Day     Current packs/day: 0.50     Average packs/day: 0.5 packs/day for 21.9 years (11.0 ttl pk-yrs)     Types: Cigarettes     Start date: 9/20/2002    Smokeless tobacco: Never    Tobacco comments:     Try to quit    Vaping Use    Vaping status: Never Used   Substance and Sexual Activity    Alcohol use: Not Currently    Drug use: Yes     Types: Opiates , Marijuana (Weed), Cocaine, Methamphetamines (Crystal Meth)     Comment: denies recnt use    Sexual activity: Not  400 MG/5ML suspension 30 mL, 30 mL, Oral, Daily PRN, Aftab Christy MD    aluminum & magnesium hydroxide-simethicone (MAALOX) 200-200-20 MG/5ML suspension 30 mL, 30 mL, Oral, PRN, Aftab Christy MD    hydrOXYzine pamoate (VISTARIL) capsule 50 mg, 50 mg, Oral, TID PRN, Aftab Christy MD    haloperidol (HALDOL) tablet 5 mg, 5 mg, Oral, Q6H PRN **OR** haloperidol lactate (HALDOL) injection 5 mg, 5 mg, IntraMUSCular, Q6H PRN, Aftab Christy MD    melatonin tablet 3 mg, 3 mg, Oral, Nightly PRN, Aftab Christy MD, 3 mg at 08/19/24 2132      Examination:  BP (!) 107/56   Pulse 50   Temp 98.8 °F (37.1 °C) (Oral)   Resp 14   Ht 1.727 m (5' 8\")   Wt 56.7 kg (125 lb)   SpO2 98%   BMI 19.01 kg/m²   Gait - steady  Medication side effects(SE): None reported    Mental Status Examination:    Level of consciousness:  within normal limits   Appearance: Disheveled  Behavior/Motor: Psychomotor agitation 2/2 withdrawal  Attitude toward examiner: Uncooperative  Speech:  spontaneous, normal rate and normal volume  Mood: Not stated  Affect: Irritable dismissive congruent with stated mood  Thought processes: Unstable concrete  Thought content: Denies auditory or visual hallucinations delusions or any other perceptual abnormalities endorses suicidal ideations contracts for safety denies homicidal ideations intent or plan  Language: able to name objects and repeate phrases  Immediate recent remote memory intact  Cognition:  oriented to person, place, and time   Fund of Knowledge: Vocabulary intact, pt is aware of current events and past history  Attention and Concentration distractible  Judgment impulse control poor    ASSESSMENT:   Patient symptoms are:  [] Well controlled  [] Improving  [] Worsening  [x] No change      Diagnosis:   Principal Problem:    Mood disorder (HCC)  Resolved Problems:    * No resolved hospital problems. *      LABS:    No results for input(s): \"WBC\", \"HGB\", \"PLT\" in the last 72  by ANNALEE Womack - CNP on 8/20/2024 at 10:31 AM

## 2024-08-20 NOTE — PLAN OF CARE
Pt remains isolative to his room. Pt reports a depression level as a 10 on a scale from 0-10 and anxiety a 5 on a scale from 0-10. Pt is not attending group activities. Pt is med compliant and is calm and cooperative. Pt has a blunt affect. Pt denies any SI/HI/AVH.       Problem: Risk for Elopement  Goal: Patient will not exit the unit/facility without proper excort  8/19/2024 2246 by Vivek Ortega, RN  Outcome: Progressing     Problem: Anxiety  Goal: Will report anxiety at manageable levels  Description: INTERVENTIONS:  1. Administer medication as ordered  2. Teach and rehearse alternative coping skills  3. Provide emotional support with 1:1 interaction with staff  8/19/2024 2246 by Vivek Ortega, RN  Outcome: Progressing     Problem: Coping  Goal: Pt/Family able to verbalize concerns and demonstrate effective coping strategies  Description: INTERVENTIONS:  1. Assist patient/family to identify coping skills, available support systems and cultural and spiritual values  2. Provide emotional support, including active listening and acknowledgement of concerns of patient and caregivers  3. Reduce environmental stimuli, as able  4. Instruct patient/family in relaxation techniques, as appropriate  5. Assess for spiritual pain/suffering and initiate Spiritual Care, Psychosocial Clinical Specialist consults as needed  8/19/2024 2246 by Vivek Ortega, RN  Outcome: Progressing     Problem: Decision Making  Goal: Pt/Family able to effectively weigh alternatives and participate in decision making related to treatment and care  Description: INTERVENTIONS:  1. Determine when there are differences between patient's view, family's view, and healthcare provider's view of condition  2. Facilitate patient and family articulation of goals for care  3. Help patient and family identify pros/cons of alternative solutions  4. Provide information as requested by patient/family  5. Respect patient/family right to receive or not to receive  information  6. Serve as a liaison between patient and family and health care team  7. Initiate Consults from Ethics, Palliative Care or initiate Family Care Conference as is appropriate  8/19/2024 2246 by Vivek Ortega RN  Outcome: Progressing     Problem: Behavior  Goal: Pt/Family maintain appropriate behavior and adhere to behavioral management agreement, if implemented  Description: INTERVENTIONS:  1. Assess patient/family's coping skills and  non-compliant behavior (including use of illegal substances)  2. Notify security of behavior or suspected illegal substances which indicate the need for search of the family and/or belongings  3. Encourage verbalization of thoughts and concerns in a socially appropriate manner  4. Utilize positive, consistent limit setting strategies supporting safety of patient, staff and others  5. Encourage participation in the decision making process about the behavioral management agreement  6. If a visitor's behavior poses a threat to safety call refer to organization policy.  7. Initiate consult with , Psychosocial CNS, Spiritual Care as appropriate  8/19/2024 2246 by Vivek Ortega RN  Outcome: Progressing     Problem: Depression/Self Harm  Goal: Effect of psychiatric condition will be minimized and patient will be protected from self harm  Description: INTERVENTIONS:  1. Assess impact of patient's symptoms on level of functioning, self care needs and offer support as indicated  2. Assess patient/family knowledge of depression, impact on illness and need for teaching  3. Provide emotional support, presence and reassurance  4. Assess for possible suicidal thoughts or ideation. If patient expresses suicidal thoughts or statements do not leave alone, initiate Suicide Precautions, move to a room close to the nursing station and obtain sitter  5. Initiate consults as appropriate with Mental Health Professional, Spiritual Care, Psychosocial CNS, and consider a recommendation

## 2024-08-21 LAB
DATE LAST DOSE: ABNORMAL
TME LAST DOSE: ABNORMAL H
VALPROATE SERPL-MCNC: 25 UG/ML (ref 50–100)
VANCOMYCIN DOSE: ABNORMAL MG

## 2024-08-21 PROCEDURE — 6370000000 HC RX 637 (ALT 250 FOR IP): Performed by: NURSE PRACTITIONER

## 2024-08-21 PROCEDURE — 80164 ASSAY DIPROPYLACETIC ACD TOT: CPT

## 2024-08-21 PROCEDURE — 99232 SBSQ HOSP IP/OBS MODERATE 35: CPT | Performed by: NURSE PRACTITIONER

## 2024-08-21 PROCEDURE — 1240000000 HC EMOTIONAL WELLNESS R&B

## 2024-08-21 PROCEDURE — 6370000000 HC RX 637 (ALT 250 FOR IP): Performed by: PSYCHIATRY & NEUROLOGY

## 2024-08-21 PROCEDURE — 36415 COLL VENOUS BLD VENIPUNCTURE: CPT

## 2024-08-21 RX ADMIN — NICOTINE POLACRILEX 2 MG: 2 LOZENGE ORAL at 22:34

## 2024-08-21 RX ADMIN — DIVALPROEX SODIUM 250 MG: 250 TABLET, DELAYED RELEASE ORAL at 09:28

## 2024-08-21 RX ADMIN — OLANZAPINE 5 MG: 5 TABLET, FILM COATED ORAL at 20:47

## 2024-08-21 RX ADMIN — Medication 3 MG: at 20:47

## 2024-08-21 RX ADMIN — DIVALPROEX SODIUM 250 MG: 250 TABLET, DELAYED RELEASE ORAL at 20:47

## 2024-08-21 ASSESSMENT — PAIN SCALES - GENERAL
PAINLEVEL_OUTOF10: 0

## 2024-08-21 NOTE — PROGRESS NOTES
Patient declined to attend the following groups:    [facility]     Will continue to encourage patient to attend programming.

## 2024-08-21 NOTE — PROGRESS NOTES
No resolved hospital problems. *      LABS:    No results for input(s): \"WBC\", \"HGB\", \"PLT\" in the last 72 hours.    No results for input(s): \"NA\", \"K\", \"CL\", \"CO2\", \"BUN\", \"CREATININE\", \"GLUCOSE\" in the last 72 hours.    No results for input(s): \"BILITOT\", \"ALKPHOS\", \"AST\", \"ALT\" in the last 72 hours.    Lab Results   Component Value Date/Time    BARBSCNU NEGATIVE 08/16/2024 04:15 AM    LABBENZ NEGATIVE 08/16/2024 04:15 AM    LABMETH NEGATIVE 08/16/2024 04:15 AM    ETOH <10 08/16/2024 04:31 AM     No results found for: \"TSH\", \"FREET4\"  No results found for: \"LITHIUM\"  Lab Results   Component Value Date    VALPROATE <3 (L) 08/16/2024       Treatment Plan:  The patient's diagnosis, treatment plan, medication management were formulated after patient was seen directly by the attending physician and myself and all relevant documentation was reviewed.    Risk, benefit, side effects, possible outcomes of the medication and alternatives discussed with the patient and the patient demonstrated understanding.  The patient was also educated that the outcome of treatment will depend on the medication compliance as directed by the prescribers along with regular follow-up, compliance with the labs and other work-up, as clinically indicated.      Collateral information: Followed by social  CD evaluation  Encourage patient to attend group and other milieu activities.  Discharge planning discussed with the patient and treatment team.    Depakote 250 mg twice daily VPA level tomorrow  Zyprexa 5 mg at bedtime        PSYCHOTHERAPY/COUNSELING:  [x] Therapeutic interview  [x] Supportive  [] CBT  [] Ongoing  [] Other    [x] Patient continues to need, on a daily basis, active treatment furnished directly by or requiring the supervision of inpatient psychiatric personnel      Anticipated Length of stay:    3 to 5 days based on stability       NOTE: This report was transcribed using voice recognition software. Every effort was made to ensure  accuracy; however, inadvertent computerized transcription errors may be present.    Electronically signed by ANNALEE Womack CNP on 8/21/2024 at 9:04 AM

## 2024-08-21 NOTE — GROUP NOTE
Group Therapy Note    Date: 8/21/2024    Group Start Time: 1000  Group End Time: 1035  Group Topic: Recreational    [facility]    Edilma Chao, CTRS    Date: 8/21/2024  Module Name:  The effects of stress     Patient's Goal:  Pt will be able to identify the physical effects of stress, and simple coping skills to manage daily stress.     Notes:  Pleasant and engaged, sharing when prompted. Accepting of handout.     Status After Intervention:  Improved    Participation Level: Active Listener and Interactive    Participation Quality: Appropriate, Attentive, and Sharing      Speech:  normal      Thought Process/Content: Logical      Affective Functioning: Congruent      Mood: euthymic      Level of consciousness:  Alert and Oriented x4      Response to Learning: Able to verbalize/acknowledge new learning, Able to retain information, and Progressing to goal      Endings: None Reported    Modes of Intervention: Education, Support, Socialization, and Problem-solving      Discipline Responsible: Psychoeducational Specialist      Signature:  Edilma Chao, CTRS

## 2024-08-21 NOTE — PLAN OF CARE
Problem: Anxiety  Goal: Will report anxiety at manageable levels  Description: INTERVENTIONS:  1. Administer medication as ordered  2. Teach and rehearse alternative coping skills  3. Provide emotional support with 1:1 interaction with staff  8/20/2024 2208 by Carmelina Dominguez, RN  Outcome: Progressing     Problem: Decision Making  Goal: Pt/Family able to effectively weigh alternatives and participate in decision making related to treatment and care  Description: INTERVENTIONS:  1. Determine when there are differences between patient's view, family's view, and healthcare provider's view of condition  2. Facilitate patient and family articulation of goals for care  3. Help patient and family identify pros/cons of alternative solutions  4. Provide information as requested by patient/family  5. Respect patient/family right to receive or not to receive information  6. Serve as a liaison between patient and family and health care team  7. Initiate Consults from Ethics, Palliative Care or initiate Family Care Conference as is appropriate  8/20/2024 2208 by Carmelina Dominguez, RN  Outcome: Progressing

## 2024-08-21 NOTE — CARE COORDINATION
SW met with pt in treatment team. Pt stated that he is feeling good today and he is doing better. Pt stated that he has not went to any groups and he denied having any SI today and stated that he last had SI yesterday morning. Pt stated that he is unsure where he will be going when he is discharged from the hospital and he has friends/family he may be able to stay with, however he has not talked to them. Pt denied wanting any substance use rehab and denied that there is a need for this service.

## 2024-08-21 NOTE — PLAN OF CARE
[facility] [place]  Day 3 Interdisciplinary Treatment Plan NOTE    Review Date & Time:  8/21/24 1000    Patient was in treatment team    Estimated Length of Stay Update:   5 DAYS  Estimated Discharge Date Update:  FRIDAY    EDUCATION:   Learner Progress Toward Treatment Goals: Reviewed results and recommendations of this team    Method: Small group    Outcome: Needs reinforcement    PATIENT GOALS: \"NO\"    PLAN/TREATMENT RECOMMENDATIONS UPDATE: SUICIDE RISK ASSESSMENTS, ENCOURAGE COMPLIANCE TO GROUPS, MEDICATIONS, SUPPORTIVE CARE, DISPOSITION ASSIST, DISCHARGE PLANNING AND FOLLOW UP    GOALS UPDATE:   Time frame for Short-Term Goals:  5 DAYS      Nadja Burton RN

## 2024-08-21 NOTE — PROGRESS NOTES
Patient denies suicidal ideation, homicidal ideations and AVH.  Pt reports anxiety 5/10 and depression 10/10 d/t \"life situations\". Pt presents flat, anxious, depressed, calm and cooperative during assessment.  Patient is out on the unit but isolative to self.  Medications taken without issue.  No complaints or concerns verbalized at this time.  No unit problems reported.  Will continue to observe and support.

## 2024-08-22 LAB
DATE LAST DOSE: ABNORMAL
TME LAST DOSE: ABNORMAL H
VALPROATE SERPL-MCNC: 33 UG/ML (ref 50–100)
VANCOMYCIN DOSE: ABNORMAL MG

## 2024-08-22 PROCEDURE — 6370000000 HC RX 637 (ALT 250 FOR IP): Performed by: PSYCHIATRY & NEUROLOGY

## 2024-08-22 PROCEDURE — 80164 ASSAY DIPROPYLACETIC ACD TOT: CPT

## 2024-08-22 PROCEDURE — 36415 COLL VENOUS BLD VENIPUNCTURE: CPT

## 2024-08-22 PROCEDURE — 6370000000 HC RX 637 (ALT 250 FOR IP): Performed by: NURSE PRACTITIONER

## 2024-08-22 PROCEDURE — 99232 SBSQ HOSP IP/OBS MODERATE 35: CPT | Performed by: NURSE PRACTITIONER

## 2024-08-22 PROCEDURE — 1240000000 HC EMOTIONAL WELLNESS R&B

## 2024-08-22 RX ORDER — DIVALPROEX SODIUM 500 MG/1
500 TABLET, DELAYED RELEASE ORAL EVERY 12 HOURS SCHEDULED
Status: DISCONTINUED | OUTPATIENT
Start: 2024-08-22 | End: 2024-08-23 | Stop reason: HOSPADM

## 2024-08-22 RX ADMIN — NICOTINE POLACRILEX 2 MG: 2 LOZENGE ORAL at 17:49

## 2024-08-22 RX ADMIN — OLANZAPINE 5 MG: 5 TABLET, FILM COATED ORAL at 20:34

## 2024-08-22 RX ADMIN — DIVALPROEX SODIUM 500 MG: 500 TABLET, DELAYED RELEASE ORAL at 20:34

## 2024-08-22 RX ADMIN — HYDROXYZINE PAMOATE 50 MG: 50 CAPSULE ORAL at 15:24

## 2024-08-22 RX ADMIN — DIVALPROEX SODIUM 250 MG: 250 TABLET, DELAYED RELEASE ORAL at 08:52

## 2024-08-22 RX ADMIN — Medication 3 MG: at 20:34

## 2024-08-22 ASSESSMENT — PAIN SCALES - GENERAL: PAINLEVEL_OUTOF10: 0

## 2024-08-22 NOTE — GROUP NOTE
Group Therapy Note    Date: 8/22/2024    Group Start Time: 0930  Group End Time: 0945  Group Topic: Community Meeting    [facility]    Angelita Soler CTRS    Group Therapy Note    Attendees: 11    Date: 8/22/2024  Start Time: 0930  End Time:  0945  Number of Participants: 11    Type of Group: Community Meeting    Was updated on expectations of the unit, staffing, and programming.  Patient left group before verbalizing goal for the day.    Status After Intervention:  Unchanged    Participation Level: None    Participation Quality: Resistant      Speech:  None      Thought Process/Content: None observed      Affective Functioning: Congruent      Mood:  Appropriate      Level of consciousness:  Preoccupied and Inattentive      Response to Learning: Resistant      Endings: None Reported    Modes of Intervention: Education, Support, Socialization, Exploration, Clarifying, and Problem-solving      Discipline Responsible: Psychoeducational Specialist      Signature:  AMY George

## 2024-08-22 NOTE — PLAN OF CARE
Problem: Risk for Elopement  Goal: Patient will not exit the unit/facility without proper excort  Outcome: Progressing     Problem: Anxiety  Goal: Will report anxiety at manageable levels  Description: INTERVENTIONS:  1. Administer medication as ordered  2. Teach and rehearse alternative coping skills  3. Provide emotional support with 1:1 interaction with staff  Outcome: Progressing     Problem: Coping  Goal: Pt/Family able to verbalize concerns and demonstrate effective coping strategies  Description: INTERVENTIONS:  1. Assist patient/family to identify coping skills, available support systems and cultural and spiritual values  2. Provide emotional support, including active listening and acknowledgement of concerns of patient and caregivers  3. Reduce environmental stimuli, as able  4. Instruct patient/family in relaxation techniques, as appropriate  5. Assess for spiritual pain/suffering and initiate Spiritual Care, Psychosocial Clinical Specialist consults as needed  Outcome: Progressing     Problem: Decision Making  Goal: Pt/Family able to effectively weigh alternatives and participate in decision making related to treatment and care  Description: INTERVENTIONS:  1. Determine when there are differences between patient's view, family's view, and healthcare provider's view of condition  2. Facilitate patient and family articulation of goals for care  3. Help patient and family identify pros/cons of alternative solutions  4. Provide information as requested by patient/family  5. Respect patient/family right to receive or not to receive information  6. Serve as a liaison between patient and family and health care team  7. Initiate Consults from Ethics, Palliative Care or initiate Family Care Conference as is appropriate  Outcome: Progressing     Problem: Behavior  Goal: Pt/Family maintain appropriate behavior and adhere to behavioral management agreement, if implemented  Description: INTERVENTIONS:  1. Assess  patient/family's coping skills and  non-compliant behavior (including use of illegal substances)  2. Notify security of behavior or suspected illegal substances which indicate the need for search of the family and/or belongings  3. Encourage verbalization of thoughts and concerns in a socially appropriate manner  4. Utilize positive, consistent limit setting strategies supporting safety of patient, staff and others  5. Encourage participation in the decision making process about the behavioral management agreement  6. If a visitor's behavior poses a threat to safety call refer to organization policy.  7. Initiate consult with , Psychosocial CNS, Spiritual Care as appropriate  Outcome: Progressing     Problem: Depression/Self Harm  Goal: Effect of psychiatric condition will be minimized and patient will be protected from self harm  Description: INTERVENTIONS:  1. Assess impact of patient's symptoms on level of functioning, self care needs and offer support as indicated  2. Assess patient/family knowledge of depression, impact on illness and need for teaching  3. Provide emotional support, presence and reassurance  4. Assess for possible suicidal thoughts or ideation. If patient expresses suicidal thoughts or statements do not leave alone, initiate Suicide Precautions, move to a room close to the nursing station and obtain sitter  5. Initiate consults as appropriate with Mental Health Professional, Spiritual Care, Psychosocial CNS, and consider a recommendation to the [facility] for a Psychiatric Consultation  Outcome: Progressing     Problem: Drug Abuse/Detox  Goal: Will have no detox symptoms and will verbalize plan for changing drug-related behavior  Description: INTERVENTIONS:  1. Administer medication as ordered  2. Monitor physical status  3. Provide emotional support with 1:1 interaction with staff  4. Encourage  recovery focused treatment   Outcome: Progressing     Problem: Involuntary Admit  Goal: Will

## 2024-08-22 NOTE — GROUP NOTE
Group Therapy Note    Date: 8/22/2024    Group Start Time: 1450  Group End Time: 1550  Group Topic: Cognitive Skills    [facility]    Mariela Araujo MSW, LSW        Group Therapy Note    Attendees: 13       Patient's Goal:  Pt will be able to discuss cognitive distortions and learn ways to challenge negative thoughts.     Notes:  Pt was a minimal participant in group discussion.     Status After Intervention:  Unchanged    Participation Level: Active Listener and Minimal    Participation Quality: Appropriate and Attentive      Speech:  normal      Thought Process/Content: Linear      Affective Functioning: Congruent      Mood: euthymic      Level of consciousness:  Alert, Oriented x4, and Attentive      Response to Learning: Resistant      Endings: None Reported    Modes of Intervention: Education, Support, Socialization, Exploration, Clarifying, and Problem-solving      Discipline Responsible: /Counselor      Signature:  SPARKLE Whitley LSW

## 2024-08-22 NOTE — CARE COORDINATION
SW met with pt to discuss discharge plan. Pt found resting in bed. He states that he plans to stay with a friend after discharge. He states that he does not have their phone number, so SW is unable to call them. He declined to sign IRLANDA for anyone. Pt states that he is unsure if he is able to stay with this friend as he has not spoken to them about this. He states that if he is not able to stay with this friend, then he will go find somewhere else to go as he is familiar with the resources in the area. Pt is declining that SW refer him to substance abuse rehab or homeless shelter at this time.    SW did include homeless resources, substance abuse resources, and mental health resources in pt discharge summary.

## 2024-08-22 NOTE — PROGRESS NOTES
BEHAVIORAL HEALTH FOLLOW-UP NOTE     8/22/2024     Patient was seen and examined in person, Chart reviewed   Patient's case discussed with staff/team    Chief Complaint: Suicidal ideation    Interim History:   I saw patient today in his room he is lying down and resting.  He denies suicidal or homicidal ideations intent or plan denies auditory or visual hallucinations still unable to say where he is going to live on discharge continues to report that he will \"figured out.\"  Staff indicates he did attend a group yesterday.  He has not provided anyone for collateral information and he is not participating in his discharge planning .  Patient is counseled if they continue to abuse drugs or alcohol they  may act out impulsively causing serious harm to themselves or others even though it may be unintentional they demonstrate understanding of this and has the capacity understand this.  Patient was counseled that their mental health treatment will be difficult to optimize with ongoing use of drugs or alcohol they demonstrated understanding of this and has the capacity understand this.      Appetite:   [x] Normal/Unchanged  [] Increased  [] Decreased      Sleep:       [x] Normal/Unchanged  [] Fair       [] Poor              Energy:    [] Normal/Unchanged  [] Increased  [x] Decreased        SI [] Present  [x] Absent    HI  []Present  [x] Absent     Aggression:  [] yes  [x] no    Patient is [x] able  [] unable to CONTRACT FOR SAFETY     PAST MEDICAL/PSYCHIATRIC HISTORY:   Past Medical History:   Diagnosis Date    Opioid use disorder        FAMILY/SOCIAL HISTORY:  Family History   Problem Relation Age of Onset    Hypertension Mother     Pancreatic Cancer Father 56     Social History     Socioeconomic History    Marital status: Single     Spouse name: Not on file    Number of children: Not on file    Years of education: Not on file    Highest education level: Not on file   Occupational History     Employer: [employer]

## 2024-08-22 NOTE — PLAN OF CARE
Problem: Risk for Elopement  Goal: Patient will not exit the unit/facility without proper excort  8/21/2024 2028 by Kay Greenwood RN  Outcome: Progressing  8/21/2024 1220 by Nadja Burton RN  Outcome: Progressing     Problem: Anxiety  Goal: Will report anxiety at manageable levels  Description: INTERVENTIONS:  1. Administer medication as ordered  2. Teach and rehearse alternative coping skills  3. Provide emotional support with 1:1 interaction with staff  8/21/2024 2028 by Kay Greenwood RN  Outcome: Progressing  8/21/2024 1220 by Nadja Burton RN  Outcome: Progressing     Problem: Coping  Goal: Pt/Family able to verbalize concerns and demonstrate effective coping strategies  Description: INTERVENTIONS:  1. Assist patient/family to identify coping skills, available support systems and cultural and spiritual values  2. Provide emotional support, including active listening and acknowledgement of concerns of patient and caregivers  3. Reduce environmental stimuli, as able  4. Instruct patient/family in relaxation techniques, as appropriate  5. Assess for spiritual pain/suffering and initiate Spiritual Care, Psychosocial Clinical Specialist consults as needed  8/21/2024 2028 by Kay Greenwood RN  Outcome: Progressing  8/21/2024 1220 by Nadja Burton RN  Outcome: Progressing     Problem: Decision Making  Goal: Pt/Family able to effectively weigh alternatives and participate in decision making related to treatment and care  Description: INTERVENTIONS:  1. Determine when there are differences between patient's view, family's view, and healthcare provider's view of condition  2. Facilitate patient and family articulation of goals for care  3. Help patient and family identify pros/cons of alternative solutions  4. Provide information as requested by patient/family  5. Respect patient/family right to receive or not to receive information  6. Serve as a liaison between patient and family and health care team  7.  CNS, and consider a recommendation to the [facility] for a Psychiatric Consultation  8/21/2024 2028 by Kay Greenwood RN  Outcome: Progressing  8/21/2024 1220 by Nadja Burton RN  Outcome: Progressing     Problem: Drug Abuse/Detox  Goal: Will have no detox symptoms and will verbalize plan for changing drug-related behavior  Description: INTERVENTIONS:  1. Administer medication as ordered  2. Monitor physical status  3. Provide emotional support with 1:1 interaction with staff  4. Encourage  recovery focused treatment   8/21/2024 2028 by Kay Greenwood RN  Outcome: Progressing  8/21/2024 1220 by Nadja Burton RN  Outcome: Progressing     Problem: Involuntary Admit  Goal: Will cooperate with staff recommendations and doctor's orders and will demonstrate appropriate behavior  Description: INTERVENTIONS:  1. Treat underlying conditions and offer medication as ordered  2. Educate regarding involuntary admission procedures and rules  3. Contain excessive/inappropriate behavior per unit and hospital policies  8/21/2024 2028 by Kay Greenwood RN  Outcome: Progressing  8/21/2024 1220 by Nadja Burton RN  Outcome: Progressing     Problem: Pain  Goal: Verbalizes/displays adequate comfort level or baseline comfort level  8/21/2024 2028 by Kay Greenwood RN  Outcome: Progressing  8/21/2024 1220 by Nadja Burton RN  Outcome: Progressing     Pt has been isolative to room for majority of shift. He is calm and cooperative. He is taking meds and attending select groups. He rated his anxiety and depression 10/10. Pt denied homicidal/suicidal thoughts/hallucinations.

## 2024-08-23 VITALS
WEIGHT: 125 LBS | BODY MASS INDEX: 18.94 KG/M2 | TEMPERATURE: 97.3 F | DIASTOLIC BLOOD PRESSURE: 65 MMHG | HEART RATE: 46 BPM | HEIGHT: 68 IN | OXYGEN SATURATION: 100 % | SYSTOLIC BLOOD PRESSURE: 102 MMHG | RESPIRATION RATE: 15 BRPM

## 2024-08-23 PROCEDURE — 99239 HOSP IP/OBS DSCHRG MGMT >30: CPT | Performed by: NURSE PRACTITIONER

## 2024-08-23 PROCEDURE — 6370000000 HC RX 637 (ALT 250 FOR IP): Performed by: NURSE PRACTITIONER

## 2024-08-23 RX ORDER — OLANZAPINE 5 MG/1
5 TABLET ORAL NIGHTLY
Qty: 30 TABLET | Refills: 0 | Status: SHIPPED | OUTPATIENT
Start: 2024-08-23 | End: 2024-09-22

## 2024-08-23 RX ORDER — DIVALPROEX SODIUM 500 MG/1
500 TABLET, DELAYED RELEASE ORAL EVERY 12 HOURS SCHEDULED
Qty: 60 TABLET | Refills: 0 | Status: SHIPPED | OUTPATIENT
Start: 2024-08-23 | End: 2024-09-22

## 2024-08-23 RX ADMIN — DIVALPROEX SODIUM 500 MG: 500 TABLET, DELAYED RELEASE ORAL at 09:30

## 2024-08-23 RX ADMIN — NICOTINE POLACRILEX 2 MG: 2 LOZENGE ORAL at 11:55

## 2024-08-23 NOTE — GROUP NOTE
Group Therapy Note    Date: 8/23/2024    Group Start Time: 0930  Group End Time: 0945  Group Topic: Community Meeting    [facility]    Angelita Soler CTRS    Group Therapy Note    Attendees: 12    Date: 8/23/2024  Start Time: 0930  End Time:  0945  Number of Participants: 12    Type of Group: Community Meeting    Was updated on expectations of the unit, staffing, and programming.  Patient shared goal for today as \"Go to groups I guess.\" Patient reported he wants to discharge. Patient often entered and exited group.    Status After Intervention:  Unchanged    Participation Level: Interactive    Participation Quality: Sharing and Inattentive      Speech:  normal      Thought Process/Content: Linear      Affective Functioning: Congruent      Mood: Underlying irritability      Level of consciousness:  Inattentive      Response to Learning: Resistant      Endings: None Reported    Modes of Intervention: Education, Support, Socialization, Exploration, Clarifying, and Problem-solving      Discipline Responsible: Psychoeducational Specialist      Signature:  AMY George

## 2024-08-23 NOTE — PROGRESS NOTES
CLINICAL PHARMACY NOTE: MEDS TO BEDS    Total # of Prescriptions Filled: 2   The following medications were delivered to the patient:  Divalproex  mg  Olanzapine 5 mg    Additional Documentation:     Tabatha RN from the unit picked up meds in the pharmacy

## 2024-08-23 NOTE — GROUP NOTE
Group Therapy Note    Date: 8/23/2024    Group Start Time: 0945  Group End Time: 1015  Group Topic: Psychoeducation    [facility] ACUTE [facility] 2    Angelita Soler CTRS    Group Therapy Note    Attendees: 11    Date: 8/23/2024  Start Time: 0945  End Time:  1015  Number of Participants: 11    Type of Group: Psychoeducation    Name:  Stop Overthinking    Patient's Goal:  Identify what leads to overthinking and ways to overcome overthinking and better decision making.    Notes:  CTRS led educational group on overthinking. Encouraged patients to share their experiences. Patient often entered and exited group. Patient did not engage in group discussion while at group.    Status After Intervention:  Unchanged    Participation Level: None    Participation Quality: Resistant      Speech:  None      Thought Process/Content: None observed      Affective Functioning: Congruent      Mood:  Appropriate      Level of consciousness:  Inattentive      Response to Learning: Resistant      Endings: None Reported    Modes of Intervention: Education, Support, Socialization, Exploration, Clarifying, and Problem-solving      Discipline Responsible: Psychoeducational Specialist      Signature:  AMY George     Prep Survey      Flowsheet Row Responses   [facility] facility patient discharged from? Non-BH   Is LACE score < 7 ? Non-BH Discharge   Eligibility [facility]   Date of Admission 06/13/24   Date of Discharge 06/14/24   Discharge diagnosis lap ccy  RUQ pain   Does the patient have one of the following disease processes/diagnoses(primary or secondary)? General Surgery   Does the patient have Home health ordered? No   Is there a DME ordered? No   Prep survey completed? Yes            MINNA BOWEN - Registered Nurse

## 2024-08-23 NOTE — PROGRESS NOTES
[facility] [place]  Discharge Note    Pt discharged with followings belongings:   Dental Appliances: None (2 partly used deoterant dispensers, one partly used fragance body spray, one new orange shirt in plastic package. tan colored carry / back pack, plastic orange folder w papers fr [place].   blue cardboard folder from past  admission,  spiral notebook)  Vision - Corrective Lenses: None  Hearing Aid: None  Jewelry: None  Body Piercings Removed: N/A  Clothing: Belt, Footwear, Pants, Shirt, Shorts, Socks, Jacket/Coat (composition notebook. folded pants ( 2) sweat pants ( 1) blue tobagan, uppers and lowers non matching long johns, 3 t shirts, 2 golf shirts.  2 collared shirts.)  Other Valuables:  (2 dollar bills, 5 thelma, two dimes, one qurter, ear plug,  plastic blue lighter, one cigarillo, silver colored mini tool kit, 2 black plastic gloves, hat, cell phone, gray hygiene case containing tweezers, nail file, nail marcelino, ID & debit card.)   Valuables sent home with yes or returned to patient. Patient educated on aftercare instructions: yes  Information faxed to n/a by n/a  at 12:09 PM .Patient verbalize understanding of AVS:  yes.    Status EXAM upon discharge:  Mental Status and Behavioral Exam  Normal: No  Level of Assistance: Independent/Self  Facial Expression: Worried  Affect: Appropriate  Level of Consciousness: Alert  Frequency of Checks: 4 times per hour, close  Mood:Normal: No  Mood: Anxious  Motor Activity:Normal: Yes  Motor Activity: Increased  Eye Contact: Fair  Observed Behavior: Cooperative, Friendly  Sexual Misconduct History: Current - no  Preception: Spencer to person, Spencer to time, Spencer to place, Spencer to situation  Attention:Normal: No  Attention: Distractible  Thought Processes: Unremarkable  Thought Content:Normal: No  Thought Content: Poverty of content  Depression Symptoms: No problems reported or observed.  Anxiety Symptoms: Generalized  Deisi Symptoms: No problems

## 2024-08-23 NOTE — PLAN OF CARE
Problem: Risk for Elopement  Goal: Patient will not exit the unit/facility without proper excort  8/22/2024 2317 by Sudeep Akbar RN  Outcome: Progressing     Problem: Anxiety  Goal: Will report anxiety at manageable levels  Description: INTERVENTIONS:  1. Administer medication as ordered  2. Teach and rehearse alternative coping skills  3. Provide emotional support with 1:1 interaction with staff  8/22/2024 2317 by Sudeep Akbar RN  Outcome: Progressing     Problem: Coping  Goal: Pt/Family able to verbalize concerns and demonstrate effective coping strategies  Description: INTERVENTIONS:  1. Assist patient/family to identify coping skills, available support systems and cultural and spiritual values  2. Provide emotional support, including active listening and acknowledgement of concerns of patient and caregivers  3. Reduce environmental stimuli, as able  4. Instruct patient/family in relaxation techniques, as appropriate  5. Assess for spiritual pain/suffering and initiate Spiritual Care, Psychosocial Clinical Specialist consults as needed  8/22/2024 2317 by Sudeep Akbar RN  Outcome: Progressing     Problem: Decision Making  Goal: Pt/Family able to effectively weigh alternatives and participate in decision making related to treatment and care  Description: INTERVENTIONS:  1. Determine when there are differences between patient's view, family's view, and healthcare provider's view of condition  2. Facilitate patient and family articulation of goals for care  3. Help patient and family identify pros/cons of alternative solutions  4. Provide information as requested by patient/family  5. Respect patient/family right to receive or not to receive information  6. Serve as a liaison between patient and family and health care team  7. Initiate Consults from Ethics, Palliative Care or initiate Family Care Conference as is appropriate  8/22/2024 2317 by Sudeep Akbar RN  Outcome: Progressing     Problem: Depression/Self  medication as ordered  2. Monitor physical status  3. Provide emotional support with 1:1 interaction with staff  4. Encourage  recovery focused treatment   8/22/2024 2317 by Sudeep Akbar RN  Outcome: Progressing     Problem: Involuntary Admit  Goal: Will cooperate with staff recommendations and doctor's orders and will demonstrate appropriate behavior  Description: INTERVENTIONS:  1. Treat underlying conditions and offer medication as ordered  2. Educate regarding involuntary admission procedures and rules  3. Contain excessive/inappropriate behavior per unit and hospital policies  8/22/2024 2317 by Sudeep Akbar, RN  Outcome: Progressing     Problem: Pain  Goal: Verbalizes/displays adequate comfort level or baseline comfort level  8/22/2024 2317 by Sudeep Akbar RN  Outcome: Progressing     Patient laying in bed upon approach. Patient denied SI/HI. Patient denied anxiety and depression. Patient remains med compliant and behavior remains in control. Patient has been visible on unit and is able to make needs known. Safety rounds done q15 minutes. Will continue to monitor.

## 2024-08-23 NOTE — PLAN OF CARE
Problem: Anxiety  Goal: Will report anxiety at manageable levels  Description: INTERVENTIONS:  1. Administer medication as ordered  2. Teach and rehearse alternative coping skills  3. Provide emotional support with 1:1 interaction with staff  8/23/2024 0901 by Sherin Chen RN  Outcome: Progressing  8/22/2024 2317 by Sudeep Akbar RN  Outcome: Progressing     Problem: Coping  Goal: Pt/Family able to verbalize concerns and demonstrate effective coping strategies  Description: INTERVENTIONS:  1. Assist patient/family to identify coping skills, available support systems and cultural and spiritual values  2. Provide emotional support, including active listening and acknowledgement of concerns of patient and caregivers  3. Reduce environmental stimuli, as able  4. Instruct patient/family in relaxation techniques, as appropriate  5. Assess for spiritual pain/suffering and initiate Spiritual Care, Psychosocial Clinical Specialist consults as needed  8/23/2024 0901 by Sherin Chen RN  Outcome: Progressing  8/22/2024 2317 by Sudeep Akbar RN  Outcome: Progressing     Problem: Decision Making  Goal: Pt/Family able to effectively weigh alternatives and participate in decision making related to treatment and care  Description: INTERVENTIONS:  1. Determine when there are differences between patient's view, family's view, and healthcare provider's view of condition  2. Facilitate patient and family articulation of goals for care  3. Help patient and family identify pros/cons of alternative solutions  4. Provide information as requested by patient/family  5. Respect patient/family right to receive or not to receive information  6. Serve as a liaison between patient and family and health care team  7. Initiate Consults from Ethics, Palliative Care or initiate Family Care Conference as is appropriate  8/23/2024 0901 by Sherin Chen RN  Outcome: Progressing  8/22/2024 2317 by Sudeep Akbar RN  Outcome: Progressing      Pt denies SI, HI and AVH. Pt out on the unit for breakfast. Pt signed IRLANDA for friend Jason this AM. Pt focused on going home to his friends house. Medication compliant. Encouraged groups. Pt stated anxiety and depression are 10/10. Appetite appropriate. Will continue to monitor.

## 2024-08-23 NOTE — DISCHARGE SUMMARY
DISCHARGE SUMMARY      Patient ID:  Tejinder Johnson  [id number]  33 y.o.  1990    Admit date: 8/16/2024    Discharge date and time: 8/23/2024    Admitting Physician: Aftab Christy MD     Discharge Physician: Dr Westley PHILLIPS    Discharge Diagnoses:   Patient Active Problem List   Diagnosis    Left inguinal hernia    Acute psychosis (HCC)    Mood disorder (HCC)    Substance induced mood disorder (HCC)    Personal history of noncompliance with medical treatment, presenting hazards to health    Antisocial personality disorder (HCC)       Admission Condition: poor    Discharged Condition: stable    Admission Circumstance: Tejinder Johnson is a 33 year old male with history of polysubstance abuse, mood disorder, suicide attempts,  with past psychiatric hospitalizations here, presenting to [facility] ER as a walk in reporting SI with plan to jump into traffic. Placed on involuntary hold by the ER  Precipitating events include recent loss of job, off medications for 5 days and polysubstance abuse, duration of acute symptoms have been on going worsening for a couple of days.          PAST MEDICAL/PSYCHIATRIC HISTORY:   Past Medical History:   Diagnosis Date    Opioid use disorder        FAMILY/SOCIAL HISTORY:  Family History   Problem Relation Age of Onset    Hypertension Mother     Pancreatic Cancer Father 56     Social History     Socioeconomic History    Marital status: Single     Spouse name: Not on file    Number of children: Not on file    Years of education: Not on file    Highest education level: Not on file   Occupational History     Employer: [employer]   Tobacco Use    Smoking status: Every Day     Current packs/day: 0.50     Average packs/day: 0.5 packs/day for 21.9 years (11.0 ttl pk-yrs)     Types: Cigarettes     Start date: 9/20/2002    Smokeless tobacco: Never    Tobacco comments:     Try to quit    Vaping Use    Vaping status: Never Used   Substance and Sexual Activity    Alcohol use: Not Currently    Drug use:

## 2024-08-23 NOTE — TRANSITION OF CARE
Behavioral Health Transition Record    Patient Name: Tejinder Johnson  YOB: 1990   Medical Record Number: [id number]  Date of Admission: 8/16/2024  4:03 AM   Date of Discharge: 8/23/24    Attending Provider: Aftab Christy MD   Discharging Provider: Dr. Christy  To contact this individual call [phone] and ask the  to page.  If unavailable, ask to be transferred to Behavioral Health Provider on call.  A Behavioral Health Provider will be available on call 24/7 and during holidays.    Primary Care Provider: No primary care provider on file.    Allergies   Allergen Reactions    Iodine     Shellfish-Derived Products Hives       Reason for Admission: Tejinder Johnson is a 33 year old male with history of polysubstance abuse, mood disorder, suicide attempts, with past psychiatric hospitalizations here, presenting to [facility] ER as a walk in reporting SI with plan to jump into traffic. Placed on involuntary hold by the ER  Precipitating events include recent loss of job, off medications for 5 days and polysubstance abuse, duration of acute symptoms have been on going worsening for a couple of days.     Admission Diagnosis: Suicidal ideation [R45.851]  Mood disorder (HCC) [F39]    * No surgery found *    Results for orders placed or performed during the hospital encounter of 08/16/24   CBC with Auto Differential   Result Value Ref Range    WBC 6.5 4.5 - 11.5 k/uL    RBC 4.38 3.80 - 5.80 m/uL    Hemoglobin 13.1 12.5 - 16.5 g/dL    Hematocrit 38.2 37.0 - 54.0 %    MCV 87.2 80.0 - 99.9 fL    MCH 29.9 26.0 - 35.0 pg    MCHC 34.3 32.0 - 34.5 g/dL    RDW 12.8 11.5 - 15.0 %    Platelets 284 130 - 450 k/uL    MPV 9.4 7.0 - 12.0 fL    Neutrophils % 35 (L) 43.0 - 80.0 %    Lymphocytes % 52 (H) 20.0 - 42.0 %    Monocytes % 7 2.0 - 12.0 %    Eosinophils % 5 0 - 6 %    Basophils % 1 0.0 - 2.0 %    Immature Granulocytes % 0 0.0 - 5.0 %    Neutrophils Absolute 2.30 1.80 - 7.30 k/uL    Lymphocytes Absolute 3.40 1.50 -  A1c  No results found for: \"GLU\", \"GLUCPOC\"    Hemoglobin A1C   Date Value Ref Range Status   10/19/2023 5.5 4.0 - 5.6 % Final       Discharge Diagnosis: Mood disorder     Discharge Plan/Destination: home with friend    Discharge Medication List and Instructions:      Medication List        CHANGE how you take these medications      divalproex 500 MG DR tablet  Commonly known as: DEPAKOTE  Take 1 tablet by mouth every 12 hours  What changed:   medication strength  how much to take  when to take this  Another medication with the same name was removed. Continue taking this medication, and follow the directions you see here.     OLANZapine 5 MG tablet  Commonly known as: ZYPREXA  Take 1 tablet by mouth nightly  What changed: Another medication with the same name was removed. Continue taking this medication, and follow the directions you see here.            CONTINUE taking these medications      nicotine polacrilex 2 MG lozenge  Commonly known as: COMMIT  Take 1 lozenge by mouth every 2 hours as needed for Smoking cessation               Where to Get Your Medications        These medications were sent to [facility] - [place] [address] - P [phone] - F [phone]  [facility] [facility] [address]      Phone: [phone]   divalproex 500 MG DR tablet  OLANZapine 5 MG tablet         Unresulted Labs (24h ago, onward)      None            To obtain results of studies pending at discharge, please contact [phone]    Follow-up Information       Follow up With Specialties Details Why Contact [facility]  Follow up Walk in Monday-Friday from 9am-3pm for a mental health intake appointment. [address]  [place] [id number]  [phone]             Advanced Directive:   Does the patient have an appointed surrogate decision maker? No  Does the patient have a Medical Advance Directive? No  Does the patient have a Psychiatric Advance Directive? No  If the

## 2024-08-23 NOTE — CARE COORDINATION
JUDAH met with pt. Pt found in his room. He is calm and cooperative with good eye contact, euthymic mood and appropriate affect. Pt states that he is feeling better and denied SI/HI/AVH. He signed IRLANDA for friend Jason [phone] and states that he plans to stay with jason after discharge. Pt states that he does not want referred to an outpatient provider for mental health or substance abuse needs at this time. JUDAH informed him that SW will include resources and walk in information for local mental health providers for him to utilize as needed. Pt states that his goal is to have stable housing and is aware that SW included resources that would be able to assist him with this long term.     SW did include homeless resources, substance abuse resources, and mental health resources in pt discharge summary.     JUDAH contacted pt friend Jason [phone] (IRLANDA signed) to gain collateral. He states that pt is able to stay with him at discharge at [address] [place] [address]. He states that last time he saw pt a week ago, pt was struggling with his bipolar and hearing voices. He states that he has spoken with pt recently and he sounds \"way better\". He is happy pt was able to get the help that he needed. He denied pt access to guns/weapons. His main concern is pt medication compliance and he plans to encourage pt to stay on his medications. He is aware that pt is refusing for SW to set pt up with appointments, but is aware that JUDAH included a list of resources in pt discharge summary for pt to follow up with after discharge.

## 2024-08-24 NOTE — CARE COORDINATION
08/24/24 0941   General Information   Contact made? Y   Which attempt is this? 1   Reason patient was called? Discharge Follow Up     JUDAH spoke with friend Jason [phone] who stated that the pt is at his home and there are no questions at this time. JUDAH informed Jason if the pt does have questions he is able to call and speak with someone, Jason was receptive of this information.

## 2025-07-07 ENCOUNTER — APPOINTMENT (OUTPATIENT)
Dept: CT IMAGING | Age: 35
End: 2025-07-07
Payer: MEDICAID

## 2025-07-07 ENCOUNTER — APPOINTMENT (OUTPATIENT)
Dept: GENERAL RADIOLOGY | Age: 35
End: 2025-07-07
Payer: MEDICAID

## 2025-07-07 ENCOUNTER — HOSPITAL ENCOUNTER (EMERGENCY)
Age: 35
Discharge: HOME | End: 2025-07-07
Payer: MEDICAID

## 2025-07-07 VITALS
BODY MASS INDEX: 25.85 KG/M2 | SYSTOLIC BLOOD PRESSURE: 135 MMHG | TEMPERATURE: 98 F | WEIGHT: 170 LBS | DIASTOLIC BLOOD PRESSURE: 62 MMHG | HEART RATE: 55 BPM | OXYGEN SATURATION: 100 % | RESPIRATION RATE: 16 BRPM

## 2025-07-07 DIAGNOSIS — S01.112A LACERATION OF LEFT EYEBROW, INITIAL ENCOUNTER: ICD-10-CM

## 2025-07-07 DIAGNOSIS — S09.90XA CLOSED HEAD INJURY, INITIAL ENCOUNTER: ICD-10-CM
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
## 2025-07-07 PROCEDURE — 6360000002 HC RX W HCPCS

## 2025-07-07 PROCEDURE — 99284 EMERGENCY DEPT VISIT MOD MDM: CPT

## 2025-07-07 PROCEDURE — 72125 CT NECK SPINE W/O DYE: CPT

## 2025-07-07 PROCEDURE — 70450 CT HEAD/BRAIN W/O DYE: CPT

## 2025-07-07 PROCEDURE — 70486 CT MAXILLOFACIAL W/O DYE: CPT

## 2025-07-07 PROCEDURE — 12013 RPR F/E/E/N/L/M 2.6-5.0 CM: CPT

## 2025-07-07 PROCEDURE — 73030 X-RAY EXAM OF SHOULDER: CPT

## 2025-07-07 PROCEDURE — 6370000000 HC RX 637 (ALT 250 FOR IP)

## 2025-07-07 PROCEDURE — 73070 X-RAY EXAM OF ELBOW: CPT

## 2025-07-07 RX ORDER — BACITRACIN ZINC 500 [USP'U]/G
OINTMENT TOPICAL ONCE
Status: COMPLETED | OUTPATIENT
Start: 2025-07-07 | End: 2025-07-07

## 2025-07-07 RX ORDER — BACITRACIN ZINC AND POLYMYXIN B SULFATE 500; 1000 [USP'U]/G; [USP'U]/G
OINTMENT TOPICAL
Qty: 28.4 G | Refills: 0 | Status: SHIPPED | OUTPATIENT
Start: 2025-07-07 | End: 2025-07-14

## 2025-07-07 RX ORDER — IBUPROFEN 800 MG/1
800 TABLET, FILM COATED ORAL ONCE
Status: COMPLETED | OUTPATIENT
Start: 2025-07-07 | End: 2025-07-07

## 2025-07-07 RX ORDER — LIDOCAINE HYDROCHLORIDE AND EPINEPHRINE 10; 10 MG/ML; UG/ML
20 INJECTION, SOLUTION INFILTRATION; PERINEURAL ONCE
Status: COMPLETED | OUTPATIENT
Start: 2025-07-07 | End: 2025-07-07

## 2025-07-07 RX ADMIN — IBUPROFEN 800 MG: 800 TABLET, FILM COATED ORAL at 16:41

## 2025-07-07 RX ADMIN — LIDOCAINE HYDROCHLORIDE,EPINEPHRINE BITARTRATE 20 ML: 10; .01 INJECTION, SOLUTION INFILTRATION; PERINEURAL at 14:01

## 2025-07-07 RX ADMIN — BACITRACIN ZINC: 500 OINTMENT TOPICAL at 16:41

## 2025-07-07 ASSESSMENT — PAIN DESCRIPTION - DESCRIPTORS: DESCRIPTORS: ACHING

## 2025-07-07 ASSESSMENT — PAIN DESCRIPTION - ORIENTATION: ORIENTATION: LEFT

## 2025-07-07 ASSESSMENT — PAIN DESCRIPTION - ONSET: ONSET: ON-GOING

## 2025-07-07 ASSESSMENT — PAIN DESCRIPTION - LOCATION
LOCATION: HEAD;SHOULDER
LOCATION: HEAD

## 2025-07-07 ASSESSMENT — PAIN - FUNCTIONAL ASSESSMENT: PAIN_FUNCTIONAL_ASSESSMENT: 0-10

## 2025-07-07 ASSESSMENT — PAIN SCALES - GENERAL
PAINLEVEL_OUTOF10: 8
PAINLEVEL_OUTOF10: 7

## 2025-07-07 ASSESSMENT — PAIN DESCRIPTION - PAIN TYPE: TYPE: ACUTE PAIN

## 2025-07-07 ASSESSMENT — PAIN DESCRIPTION - FREQUENCY: FREQUENCY: CONTINUOUS
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
## 2025-07-07 NOTE — DISCHARGE INSTRUCTIONS
- Recommend applying a thin layer of antibiotic ointment to your laceration and abrasions.  -Ice areas that are painful for 15 to 20 minutes at a time, multiple times a day  - Return to the emergency department if you develop any warmth, redness, pus drainage, fevers, chills, body aches  - Sutures can be removed in about 1 week.  Can be performed at emergency department.  If you go to an urgent care, make sure you call urgent care prior to going to make sure they will take out sutures.

## 2025-07-15 ENCOUNTER — OFFICE VISIT (OUTPATIENT)
Dept: FAMILY MEDICINE CLINIC | Age: 35
End: 2025-07-15
Payer: MEDICAID

## 2025-07-15 VITALS
SYSTOLIC BLOOD PRESSURE: 120 MMHG | TEMPERATURE: 99.2 F | WEIGHT: 170 LBS | OXYGEN SATURATION: 98 % | BODY MASS INDEX: 25.76 KG/M2 | HEART RATE: 75 BPM | HEIGHT: 68 IN | RESPIRATION RATE: 18 BRPM | DIASTOLIC BLOOD PRESSURE: 60 MMHG

## 2025-07-15 DIAGNOSIS — Z48.02 VISIT FOR SUTURE REMOVAL: Primary | ICD-10-CM

## 2025-07-15 PROCEDURE — 15853 REMOVAL SUTR/STAPL XREQ ANES: CPT | Performed by: NURSE PRACTITIONER

## 2025-07-15 PROCEDURE — 99212 OFFICE O/P EST SF 10 MIN: CPT | Performed by: NURSE PRACTITIONER

## 2025-07-15 RX ORDER — LAMOTRIGINE 100 MG/1
TABLET ORAL
COMMUNITY
Start: 2025-06-14

## 2025-07-15 RX ORDER — LISDEXAMFETAMINE DIMESYLATE 60 MG/1
CAPSULE ORAL
COMMUNITY
Start: 2025-06-14

## 2025-07-15 RX ORDER — TRAZODONE HYDROCHLORIDE 50 MG/1
TABLET ORAL
COMMUNITY
Start: 2025-06-13

## 2025-07-15 RX ORDER — BUSPIRONE HYDROCHLORIDE 30 MG/1
TABLET ORAL
COMMUNITY
Start: 2025-06-15

## 2025-07-15 NOTE — PROGRESS NOTES
7/15/25  Tejinder Johnson : 1990 Sex: male  Age 34 y.o.    Subjective:  Chief Complaint   Patient presents with    Suture / Staple Removal     Patient had them for a week. Wants removed from left eyebrow.       HPI:   Tejinder Johnson , 34 y.o. male presents to the clinic for the removal of sutures from left eyebrow, which were placed 8 days ago.  Pt denies any drainage from the wound site, pain or bleeding from the area, or wound dehiscence. Pt states the wound is healing well without any complications. Denies headache, fever, chest pain, abdominal pain, shortness of breath, and nausea / vomiting / diarrhea.    ROS:   Unless otherwise stated in this report the patient's positive and negative responses for review of systems for constitutional, eyes, ENT, cardiovascular, respiratory, gastrointestinal, neurological, , musculoskeletal, and integument systems and related systems to the presenting problem are either stated in the history of present illness or were not pertinent or were negative for the symptoms and/or complaints related to the presenting medical problem.  Positives and pertinent negatives as per HPI.  All others reviewed and are negative.      PMH:     Past Medical History:   Diagnosis Date    Opioid use disorder        Past Surgical History:   Procedure Laterality Date    HERNIA REPAIR Left 10/9/2019    LAPAROSCOPIC POSS OPEN LEFT INGUINAL HERNIA REPAIR WITH MESH performed by Antoine Little MD at [email or web address] OR       Family History   Problem Relation Age of Onset    Hypertension Mother     Pancreatic Cancer Father 56       Medications:     Current Outpatient Medications:     busPIRone (BUSPAR) 30 MG tablet, TAKE ONE TABLET BY MOUTH TWO TIMES A DAY FOR ANXIETY, Disp: , Rfl:     lamoTRIgine (LAMICTAL) 100 MG tablet, TAKE ONE TABLET BY MOUTH ONCE DAILY FOR MOOD, Disp: , Rfl:     VYVANSE 60 MG CAPS, TAKE ONE CAPSULE BY MOUTH DAILY FOR ADD OR ADHD, Disp: , Rfl:     traZODone (DESYREL) 50 MG tablet, TAKE ONE

## 2025-08-20 ENCOUNTER — OFFICE VISIT (OUTPATIENT)
Dept: FAMILY MEDICINE CLINIC | Age: 35
End: 2025-08-20
Payer: MEDICAID

## 2025-08-20 VITALS
OXYGEN SATURATION: 98 % | SYSTOLIC BLOOD PRESSURE: 122 MMHG | BODY MASS INDEX: 25.76 KG/M2 | HEART RATE: 57 BPM | HEIGHT: 68 IN | TEMPERATURE: 97.7 F | DIASTOLIC BLOOD PRESSURE: 80 MMHG | RESPIRATION RATE: 18 BRPM | WEIGHT: 170 LBS

## 2025-08-20 DIAGNOSIS — S61.012A LACERATION OF LEFT THUMB WITH COMPLICATION, INITIAL ENCOUNTER: Primary | ICD-10-CM

## 2025-08-20 PROCEDURE — 99214 OFFICE O/P EST MOD 30 MIN: CPT | Performed by: EMERGENCY MEDICINE

## 2025-08-20 PROCEDURE — 12001 RPR S/N/AX/GEN/TRNK 2.5CM/<: CPT | Performed by: EMERGENCY MEDICINE

## 2025-08-20 RX ORDER — VILOXAZINE HYDROCHLORIDE 200 MG/1
CAPSULE, EXTENDED RELEASE ORAL
COMMUNITY
Start: 2025-07-18

## 2025-08-20 ASSESSMENT — ENCOUNTER SYMPTOMS
BACK PAIN: 0
NAUSEA: 0
COUGH: 0
ABDOMINAL PAIN: 0
VOMITING: 0
DIARRHEA: 0
EYE PAIN: 0
EYE REDNESS: 0
SORE THROAT: 0
SINUS PRESSURE: 0
SHORTNESS OF BREATH: 0
WHEEZING: 0
EYE DISCHARGE: 0

## 2025-08-28 ENCOUNTER — OFFICE VISIT (OUTPATIENT)
Dept: FAMILY MEDICINE CLINIC | Age: 35
End: 2025-08-28
Payer: MEDICAID

## 2025-08-28 VITALS
HEART RATE: 64 BPM | HEIGHT: 69 IN | SYSTOLIC BLOOD PRESSURE: 114 MMHG | OXYGEN SATURATION: 100 % | TEMPERATURE: 98.5 F | DIASTOLIC BLOOD PRESSURE: 78 MMHG | BODY MASS INDEX: 25.18 KG/M2 | WEIGHT: 170 LBS

## 2025-08-28 DIAGNOSIS — Z48.02 ENCOUNTER FOR REMOVAL OF SUTURES: ICD-10-CM

## 2025-08-28 DIAGNOSIS — S61.012D LACERATION OF LEFT THUMB WITHOUT DAMAGE TO NAIL, FOREIGN BODY PRESENCE UNSPECIFIED, SUBSEQUENT ENCOUNTER: Primary | ICD-10-CM

## 2025-08-28 PROCEDURE — 99213 OFFICE O/P EST LOW 20 MIN: CPT

## 2025-08-28 PROCEDURE — 15853 REMOVAL SUTR/STAPL XREQ ANES: CPT

## 2025-08-28 RX ORDER — PHENOL 1.4 %
AEROSOL, SPRAY (ML) MUCOUS MEMBRANE
COMMUNITY
Start: 2025-08-22

## 2025-08-28 RX ORDER — MUPIROCIN 2 %
OINTMENT (GRAM) TOPICAL
Qty: 22 G | Refills: 0 | Status: SHIPPED | OUTPATIENT
Start: 2025-08-28

## (undated) DEVICE — CHLORAPREP 26ML ORANGE

## (undated) DEVICE — STAPLER INT DIA5MM 25 ABSRB STRP FIX DISP FOR HERN MESH

## (undated) DEVICE — PATIENT RETURN ELECTRODE, SINGLE-USE, CONTACT QUALITY MONITORING, ADULT, WITH 9FT CORD, FOR PATIENTS WEIGING OVER 33LBS. (15KG): Brand: MEGADYNE

## (undated) DEVICE — TOWEL,OR,DSP,ST,BLUE,STD,6/PK,12PK/CS: Brand: MEDLINE

## (undated) DEVICE — [HIGH FLOW INSUFFLATOR,  DO NOT USE IF PACKAGE IS DAMAGED,  KEEP DRY,  KEEP AWAY FROM SUNLIGHT,  PROTECT FROM HEAT AND RADIOACTIVE SOURCES.]: Brand: PNEUMOSURE

## (undated) DEVICE — PACK SURG LAP CHOLE CUSTOM

## (undated) DEVICE — DOUBLE BASIN SET: Brand: MEDLINE INDUSTRIES, INC.

## (undated) DEVICE — STANDARD HYPODERMIC NEEDLE,POLYPROPYLENE HUB: Brand: MONOJECT

## (undated) DEVICE — TROCAR: Brand: KII SLEEVE

## (undated) DEVICE — TROCAR ENDOSCP SHFT L150MM DIA8MM TEAL BLDELSS W/ STBL

## (undated) DEVICE — GARMENT,MEDLINE,DVT,INT,CALF,MED, GEN2: Brand: MEDLINE

## (undated) DEVICE — MARKER,SKIN,WI/RULER AND LABELS: Brand: MEDLINE

## (undated) DEVICE — GOWN,SIRUS,NONRNF,SETINSLV,XL,20/CS: Brand: MEDLINE

## (undated) DEVICE — CAMERA STRYKER 1488 HD GEN

## (undated) DEVICE — COVER,LIGHT HANDLE,FLX,1/PK: Brand: MEDLINE INDUSTRIES, INC.

## (undated) DEVICE — INSUFFLATION NEEDLE TO ESTABLISH PNEUMOPERITONEUM.: Brand: INSUFFLATION NEEDLE

## (undated) DEVICE — SCISSORS ENDOSCP DIA5MM CRV MPLR CAUT W/ RATCH HNDL

## (undated) DEVICE — CONTROL SYRINGE LUER-LOCK TIP: Brand: MONOJECT

## (undated) DEVICE — TROCAR: Brand: KII FIOS FIRST ENTRY

## (undated) DEVICE — GLOVE ORANGE PI 7 1/2   MSG9075

## (undated) DEVICE — SET ENDO INSTR LAPAROSCOPIC INCISIONAL